# Patient Record
Sex: FEMALE | Race: WHITE | NOT HISPANIC OR LATINO | URBAN - METROPOLITAN AREA
[De-identification: names, ages, dates, MRNs, and addresses within clinical notes are randomized per-mention and may not be internally consistent; named-entity substitution may affect disease eponyms.]

---

## 2023-04-21 ENCOUNTER — APPOINTMENT (OUTPATIENT)
Dept: LAB | Facility: LAB | Age: 28
End: 2023-04-21
Payer: COMMERCIAL

## 2023-05-08 LAB
ALANINE AMINOTRANSFERASE (SGPT) (U/L) IN SER/PLAS: 13 U/L (ref 7–45)
ALBUMIN (G/DL) IN SER/PLAS: 4.5 G/DL (ref 3.4–5)
ALBUMIN (MG/L) IN URINE: <7 MG/L
ALBUMIN/CREATININE (UG/MG) IN URINE: NORMAL UG/MG CRT (ref 0–30)
ALKALINE PHOSPHATASE (U/L) IN SER/PLAS: 59 U/L (ref 33–110)
ANION GAP IN SER/PLAS: 16 MMOL/L (ref 10–20)
APPEARANCE, URINE: CLEAR
ASPARTATE AMINOTRANSFERASE (SGOT) (U/L) IN SER/PLAS: 14 U/L (ref 9–39)
BASOPHILS (10*3/UL) IN BLOOD BY AUTOMATED COUNT: 0.05 X10E9/L (ref 0–0.1)
BASOPHILS/100 LEUKOCYTES IN BLOOD BY AUTOMATED COUNT: 0.6 % (ref 0–2)
BILIRUBIN TOTAL (MG/DL) IN SER/PLAS: 0.4 MG/DL (ref 0–1.2)
BILIRUBIN, URINE: NEGATIVE
BLOOD, URINE: NEGATIVE
CALCIDIOL (25 OH VITAMIN D3) (NG/ML) IN SER/PLAS: 46 NG/ML
CALCIUM (MG/DL) IN SER/PLAS: 9.7 MG/DL (ref 8.6–10.6)
CARBON DIOXIDE, TOTAL (MMOL/L) IN SER/PLAS: 24 MMOL/L (ref 21–32)
CHLORIDE (MMOL/L) IN SER/PLAS: 104 MMOL/L (ref 98–107)
CHOLESTEROL (MG/DL) IN SER/PLAS: 161 MG/DL (ref 0–199)
CHOLESTEROL IN HDL (MG/DL) IN SER/PLAS: 57.1 MG/DL
CHOLESTEROL/HDL RATIO: 2.8
COLOR, URINE: YELLOW
CREATININE (MG/DL) IN SER/PLAS: 0.75 MG/DL (ref 0.5–1.05)
CREATININE (MG/DL) IN URINE: 53.2 MG/DL (ref 20–320)
EOSINOPHILS (10*3/UL) IN BLOOD BY AUTOMATED COUNT: 0.28 X10E9/L (ref 0–0.7)
EOSINOPHILS/100 LEUKOCYTES IN BLOOD BY AUTOMATED COUNT: 3.4 % (ref 0–6)
ERYTHROCYTE DISTRIBUTION WIDTH (RATIO) BY AUTOMATED COUNT: 11.9 % (ref 11.5–14.5)
ERYTHROCYTE MEAN CORPUSCULAR HEMOGLOBIN CONCENTRATION (G/DL) BY AUTOMATED: 34 G/DL (ref 32–36)
ERYTHROCYTE MEAN CORPUSCULAR VOLUME (FL) BY AUTOMATED COUNT: 94 FL (ref 80–100)
ERYTHROCYTES (10*6/UL) IN BLOOD BY AUTOMATED COUNT: 4.19 X10E12/L (ref 4–5.2)
ESTIMATED AVERAGE GLUCOSE FOR HBA1C: 103 MG/DL
GAMMA GLUTAMYL TRANSFERASE (U/L) IN SER/PLAS: 16 U/L (ref 5–55)
GFR FEMALE: >90 ML/MIN/1.73M2
GLUCOSE (MG/DL) IN SER/PLAS: 98 MG/DL (ref 74–99)
GLUCOSE, URINE: NEGATIVE MG/DL
HEMATOCRIT (%) IN BLOOD BY AUTOMATED COUNT: 39.4 % (ref 36–46)
HEMOGLOBIN (G/DL) IN BLOOD: 13.4 G/DL (ref 12–16)
HEMOGLOBIN A1C/HEMOGLOBIN TOTAL IN BLOOD: 5.2 %
IGE (IU/L) IN SERUM OR PLASMA: 25 IU/ML (ref 0–214)
IMMATURE GRANULOCYTES/100 LEUKOCYTES IN BLOOD BY AUTOMATED COUNT: 0.2 % (ref 0–0.9)
KETONES, URINE: NEGATIVE MG/DL
LDL: 44 MG/DL (ref 0–99)
LEUKOCYTE ESTERASE, URINE: NEGATIVE
LEUKOCYTES (10*3/UL) IN BLOOD BY AUTOMATED COUNT: 8.2 X10E9/L (ref 4.4–11.3)
LYMPHOCYTES (10*3/UL) IN BLOOD BY AUTOMATED COUNT: 2.16 X10E9/L (ref 1.2–4.8)
LYMPHOCYTES/100 LEUKOCYTES IN BLOOD BY AUTOMATED COUNT: 26.3 % (ref 13–44)
MONOCYTES (10*3/UL) IN BLOOD BY AUTOMATED COUNT: 0.43 X10E9/L (ref 0.1–1)
MONOCYTES/100 LEUKOCYTES IN BLOOD BY AUTOMATED COUNT: 5.2 % (ref 2–10)
NEUTROPHILS (10*3/UL) IN BLOOD BY AUTOMATED COUNT: 5.26 X10E9/L (ref 1.2–7.7)
NEUTROPHILS/100 LEUKOCYTES IN BLOOD BY AUTOMATED COUNT: 64.3 % (ref 40–80)
NITRITE, URINE: NEGATIVE
NON HDL CHOLESTEROL: 104 MG/DL
NRBC (PER 100 WBCS) BY AUTOMATED COUNT: 0 /100 WBC (ref 0–0)
PH, URINE: 5 (ref 5–8)
PLATELETS (10*3/UL) IN BLOOD AUTOMATED COUNT: 248 X10E9/L (ref 150–450)
POTASSIUM (MMOL/L) IN SER/PLAS: 4.5 MMOL/L (ref 3.5–5.3)
PROTEIN TOTAL: 7.3 G/DL (ref 6.4–8.2)
PROTEIN, URINE: NEGATIVE MG/DL
RBC, URINE: NORMAL /HPF (ref 0–5)
SODIUM (MMOL/L) IN SER/PLAS: 139 MMOL/L (ref 136–145)
SPECIFIC GRAVITY, URINE: 1.01 (ref 1–1.03)
TRIGLYCERIDE (MG/DL) IN SER/PLAS: 302 MG/DL (ref 0–149)
UREA NITROGEN (MG/DL) IN SER/PLAS: 13 MG/DL (ref 6–23)
UROBILINOGEN, URINE: <2 MG/DL (ref 0–1.9)
VLDL: 60 MG/DL (ref 0–40)
WBC, URINE: NORMAL /HPF (ref 0–5)

## 2023-05-12 LAB
VITAMIN A (RETINOL): 1.04 MG/L (ref 0.3–1.2)
VITAMIN A (RETINYL PALMITATE): 0.07 MG/L (ref 0–0.1)
VITAMIN A, INTERPRETATION: NORMAL
VITAMIN E (ALPHA-TOCOPHEROL): 21.1 MG/L (ref 5.5–18)
VITAMIN E (GAMMA-TOCOPHEROL): 0.3 MG/L (ref 0–6)

## 2023-05-13 LAB
RESPIRATORY CULTURE, CYSTIC FIBROSIS: ABNORMAL

## 2023-05-30 LAB
FUNGAL CULTURE/SMEAR: NORMAL
FUNGAL SMEAR: NORMAL

## 2023-06-28 LAB
AFB CULTURE: NORMAL
AFB STAIN: NORMAL

## 2023-10-21 ENCOUNTER — PHARMACY VISIT (OUTPATIENT)
Dept: PHARMACY | Facility: CLINIC | Age: 28
End: 2023-10-21
Payer: MEDICAID

## 2023-10-21 PROCEDURE — RXMED WILLOW AMBULATORY MEDICATION CHARGE

## 2023-11-13 ENCOUNTER — PHARMACY VISIT (OUTPATIENT)
Dept: PHARMACY | Facility: CLINIC | Age: 28
End: 2023-11-13
Payer: MEDICAID

## 2023-11-13 PROCEDURE — RXMED WILLOW AMBULATORY MEDICATION CHARGE

## 2023-11-16 ENCOUNTER — SPECIALTY PHARMACY (OUTPATIENT)
Dept: PHARMACY | Facility: CLINIC | Age: 28
End: 2023-11-16

## 2023-11-30 ENCOUNTER — NUTRITION (OUTPATIENT)
Dept: PEDIATRIC PULMONOLOGY | Facility: HOSPITAL | Age: 28
End: 2023-11-30
Payer: COMMERCIAL

## 2023-12-11 PROCEDURE — RXMED WILLOW AMBULATORY MEDICATION CHARGE

## 2023-12-12 ENCOUNTER — SPECIALTY PHARMACY (OUTPATIENT)
Dept: PHARMACY | Facility: CLINIC | Age: 28
End: 2023-12-12

## 2023-12-13 ENCOUNTER — PHARMACY VISIT (OUTPATIENT)
Dept: PHARMACY | Facility: CLINIC | Age: 28
End: 2023-12-13
Payer: MEDICAID

## 2024-01-08 PROCEDURE — RXMED WILLOW AMBULATORY MEDICATION CHARGE

## 2024-01-09 ENCOUNTER — PHARMACY VISIT (OUTPATIENT)
Dept: PHARMACY | Facility: CLINIC | Age: 29
End: 2024-01-09
Payer: MEDICAID

## 2024-01-18 NOTE — PROGRESS NOTES
Neftali Walsh M.D. 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 Her last hospitalization was in May of 2018 when she was treated with IV cefepime and tobramycin and PO Bactrim for MRSA and Stenotrophomonas coverage.    HPI (1/18/2024):  Previously saw Dr. Juarez in CF clinic. This is my first time meeting Kinjal.  She reports no somatic concerns today. She has no sinus issues, breathing concerns, change in sputum production (usually has increase at night). She feels her reflux is well controlled with omeprazole and her bowel habits have been well regulated with Creon. 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 Denies any disruption in her sleep.    Social:   Intern in Pediatrics at [employer]  Lives with her boyfriend of 8 years  [facility] and medical school  From [facility], immediate family has since moved to [place]     Current Use of Health Management Strategies:    Respiratory Interventions  Albuterol: Other ; 1-2x/month; sometimes before a cold with relief  Pulmozyme: 1-2x/month  Hypertonic saline: Does not use; prior to Trikafta used 7% bid   HFCWO (percussive vest): Hillrom Vest 1-2 x/month  Oscillatory PEP: Does not use  Exercise: No Regular Exercise  LTE antagonist: No  Azithromycin: not any more  Aztreonam lysine ([place]): Does not use  Tobramycin Does not use: Does not use  Colistin: Does not use  Meropenem (inhaled): Does not use  ICS: Does not use  ICS/LABA: Mometasone-formoterol (Dulera HFA)  200mg-5mg- uses daily  LAMA: No  CFTR modulator: Elexacaftor-Tezacaftor-Ivacaftor (Trikafta) Full-dose (2 orange tablets in AM, 1 blue tablet in PM) - Started December 2020  Oxygen: Does not  use    Digestive Health Interventions    Acid-suppressing medication?: Yes - AM omperazole  Using CF vitamins?: Yes  Taking pancreatic enzymes?: Yes Creon 24 2 meals 1 snacks  Any diarrhea?: No  Any steatorrhea?: No  Any constipation?: No  Using laxatives?: No  Feeding tube?: No  Supplemental enteral nutrition?: No    Pulmonary Function Test Results  1/24/24: FEV1/FVC: 70; FEV1=2.97L (92% pred)  5/8/23: FEV1/FVC 70; FEV1=3.03L (94% pred)  4/12/21: FEV1/FVC 72: FEV1 3.16L (96% pred)        Current Medications     Current Outpatient Medications   Medication Instructions    elexacaftor-tezacaftor-ivacaft (Trikafta) 100-50-75 mg tablet TAKE TWO (2) ORANGE TABLETS BY MOUTH EVERY MORNING AND ONE (1) BLUE TABLET BY MOUTH EVERY EVENING ABOUT 12 HOURS APART. TAKE WITH FATTY FOODS       Physical Examination     Vitals:    01/24/24 0907   BP: 117/90   Pulse: 98   Resp: 18   Temp: 36.7 °C (98.1 °F)   SpO2: 99%     General: ambulated independently; no acute distress; thin but well-nourished; work of breathing was not increased; normal vocal character  HEENT: normocephalic; anicteric sclerae; conjunctivae not injected; nasal mucosa was unremarkable; oropharynx was clear without evidence of thrush; dentition was good.  Neck: supple; no lymphadenopathy  Chest: clear to auscultation bilaterally  Cardiac: regular rhythm; no gallop or murmur.  Abdomen: soft; non-tender; non-distended; no hepatosplenomegaly.  Extremities: no leg edema; +mild digital clubbing; 2+ pulses  Psychiatric: did not appear depressed or anxious.     Metabolic Parameters     Sodium   Date/Time Value Ref Range Status   05/08/2023 12:24  136 - 145 mmol/L Final     Potassium   Date/Time Value Ref Range Status   05/08/2023 12:24 PM 4.5 3.5 - 5.3 mmol/L Final     Chloride   Date/Time Value Ref Range Status   05/08/2023 12:24  98 - 107 mmol/L Final     Bicarbonate   Date/Time Value Ref Range Status   05/08/2023 12:24 PM 24 21 - 32 mmol/L Final     Anion Gap    Date/Time Value Ref Range Status   05/08/2023 12:24 PM 16 10 - 20 mmol/L Final     Urea Nitrogen   Date/Time Value Ref Range Status   05/08/2023 12:24 PM 13 6 - 23 mg/dL Final     Creatinine   Date/Time Value Ref Range Status   05/08/2023 12:24 PM 0.75 0.50 - 1.05 mg/dL Final     GFR Female   Date/Time Value Ref Range Status   05/08/2023 12:24 PM >90 >90 mL/min/1.73m2 Final     Comment:      CALCULATIONS OF ESTIMATED GFR ARE PERFORMED   USING THE 2021 CKD-EPI STUDY REFIT EQUATION   WITHOUT THE RACE VARIABLE FOR THE IDMS-TRACEABLE   CREATININE METHODS.    https://jasn.asnjournals.org/content/early/2021/09/22/ASN.8101992564       Glucose   Date/Time Value Ref Range Status   05/08/2023 12:24 PM 98 74 - 99 mg/dL Final     Calcium   Date/Time Value Ref Range Status   05/08/2023 12:24 PM 9.7 8.6 - 10.6 mg/dL Final       Hematologic Parameters     WBC   Date/Time Value Ref Range Status   05/08/2023 12:24 PM 8.2 4.4 - 11.3 x10E9/L Final     Neutrophils Absolute   Date/Time Value Ref Range Status   05/08/2023 12:24 PM 5.26 1.20 - 7.70 x10E9/L Final     Neutrophils %   Date/Time Value Ref Range Status   05/08/2023 12:24 PM 64.3 40.0 - 80.0 % Final     Lymphocytes %   Date/Time Value Ref Range Status   05/08/2023 12:24 PM 26.3 13.0 - 44.0 % Final     Monocytes %   Date/Time Value Ref Range Status   05/08/2023 12:24 PM 5.2 2.0 - 10.0 % Final     Basophils %   Date/Time Value Ref Range Status   05/08/2023 12:24 PM 0.6 0.0 - 2.0 % Final     Eosinophils Absolute   Date/Time Value Ref Range Status   05/08/2023 12:24 PM 0.28 0.00 - 0.70 x10E9/L Final     Eosinophils %   Date/Time Value Ref Range Status   05/08/2023 12:24 PM 3.4 0.0 - 6.0 % Final     Hemoglobin   Date/Time Value Ref Range Status   05/08/2023 12:24 PM 13.4 12.0 - 16.0 g/dL Final     Hematocrit   Date/Time Value Ref Range Status   05/08/2023 12:24 PM 39.4 36.0 - 46.0 % Final     RBC   Date/Time Value Ref Range Status   05/08/2023 12:24 PM 4.19 4.00 - 5.20 x10E12/L  Final     MCV   Date/Time Value Ref Range Status   05/08/2023 12:24 PM 94 80 - 100 fL Final     MCHC   Date/Time Value Ref Range Status   05/08/2023 12:24 PM 34.0 32.0 - 36.0 g/dL Final     Platelets   Date/Time Value Ref Range Status   05/08/2023 12:24  150 - 450 x10E9/L Final       Fat-Soluble Vitamin Levels     Vitamin D, 25-Hydroxy   Date/Time Value Ref Range Status   05/08/2023 12:24 PM 46 ng/mL Final     Comment:     .  DEFICIENCY:         < 20   NG/ML  INSUFFICIENCY:      20-29  NG/ML  SUFFICIENCY:         NG/ML    THIS ASSAY ACCURATELY QUANTIFIES THE SUM OF  VITAMIN D3, 25-HYDROXY AND VIT D2,25-HYDROXY.       Vitamin A (Retinol)   Date/Time Value Ref Range Status   05/08/2023 12:24 PM 1.04 0.30 - 1.20 mg/L Final     Vitamin A, Interpretation   Date/Time Value Ref Range Status   05/08/2023 12:24 PM Normal  Final     Comment:     This test was developed and its performance characteristics   determined by [facility]. It has not been cleared or   approved by the US Food and Drug Administration. This test was   performed in a CLIA certified laboratory and is intended for   clinical purposes.  Performed By: [facility]  [address] [place] [address]  : Trevor Gilliam MD, PhD       Vitamin E (Alpha-Tocopherol)   Date/Time Value Ref Range Status   05/08/2023 12:24 PM 21.1 (H) 5.5 - 18.0 mg/L Final     Comment:     This test was developed and its performance characteristics   determined by [facility]. It has not been cleared or   approved by the US Food and Drug Administration. This test was   performed in a CLIA certified laboratory and is intended for   clinical purposes.       Vitamin E (Gamma-Tocopherol)   Date/Time Value Ref Range Status   05/08/2023 12:24 PM 0.3 0.0 - 6.0 mg/L Final     Comment:     Performed By: [facility]  [address] [place], [place] [id number]  : Trevor Gilliam MD, PhD         LFT and  Associated Parameters     AST   Date/Time Value Ref Range Status   05/08/2023 12:24 PM 14 9 - 39 U/L Final     ALT (SGPT)   Date/Time Value Ref Range Status   05/08/2023 12:24 PM 13 7 - 45 U/L Final     Comment:      Patients treated with Sulfasalazine may generate    falsely decreased results for ALT.       Alkaline Phosphatase   Date/Time Value Ref Range Status   05/08/2023 12:24 PM 59 33 - 110 U/L Final     Total Bilirubin   Date/Time Value Ref Range Status   05/08/2023 12:24 PM 0.4 0.0 - 1.2 mg/dL Final     GGT   Date/Time Value Ref Range Status   05/08/2023 12:24 PM 16 5 - 55 U/L Final     Albumin   Date/Time Value Ref Range Status   05/08/2023 12:24 PM 4.5 3.4 - 5.0 g/dL Final     Total Protein   Date/Time Value Ref Range Status   05/08/2023 12:24 PM 7.3 6.4 - 8.2 g/dL Final       Coagulation Parameters     Protime   Date/Time Value Ref Range Status   05/29/2019 02:05 PM 11.4 9.7 - 12.7 sec Final     INR   Date/Time Value Ref Range Status   05/29/2019 02:05 PM 1.0 0.9 - 1.1 Final       Diabetes Laboratory Tests     Hemoglobin A1C   Date/Time Value Ref Range Status   05/08/2023 12:24 PM 5.2 % Final     Comment:          Diagnosis of Diabetes-Adults   Non-Diabetic: < or = 5.6%   Increased risk for developing diabetes: 5.7-6.4%   Diagnostic of diabetes: > or = 6.5%  .       Monitoring of Diabetes                Age (y)     Therapeutic Goal (%)   Adults:          >18           <7.0   Pediatrics:    13-18           <7.5                   7-12           <8.0                   0- 6            7.5-8.5   American Diabetes Association. Diabetes Care 33(S1), Jan 2010.     09/17/2022 09:02 AM 5.1 4.7 - 5.6 % Final     Albumin/Creatine Ratio   Date/Time Value Ref Range Status   05/08/2023 12:40 PM SEE COMMENT 0.0 - 30.0 ug/mg crt Final     Comment:     One or more analytes used in this calculation   is outside of the analytical measurement range.  Calculation cannot be performed.          Immunology Laboratory Tests      IgE   Date/Time Value Ref Range Status   05/08/2023 12:24 PM 25 0 - 214 IU/mL Final       DEXA Scan     XR DEXA bone density 05/08/2023    Narrative  Interpreted By:  RAMILA MORGAN MD  Name: ADRIÁN VALENZUELA  Patient ID: [id number] YOB: 1995 Height: 64.0 in.  Gender:     Female   Exam Date:  05/08/2023 Weight: 135.0 lbs.  Indications: Cystis Fibrosis, Family Histoy Osteoporosis, Post  Menopausal, Screening  Fractures:    Treatments:  Multivitamin        LEFT FEMUR - TOTAL  The bone mineral density : 0.827 g/cm2  T-score : -1.4    % of young normal mean :82 %  Z-score : -1.4    % of age matched mean : 82 %  % change vs. Previous : -     % change vs. Baseline : baseline  *Indicates significant change based on 95% confidence interval.    LEFT FEMUR - NECK  The bone mineral density : 0.866 g/cm2  T-score : -1.2    % of young normal mean: 83 %  Z-score : -1.2    % of age matched mean : 84 %  % change vs. Previous : -     % change vs. Baseline : baseline  *Indicates significant change based on 95% confidence interval.    SPINE L1-L4  The bone mineral density is 0.897 g/cm2  T-score : -2.4   % of young normal mean is 76 %  Z-score : -2.4    % of age matched mean is 76 %  % change vs. Previous : -     % change vs. Baseline : baseline  *Indicates significant change based on 95% confidence interval.    Interpretation:  The lowest Z-score measured at AP Spine L1-L4 with a Z-score of -2.4  is below normal limits for their age and sex.    Follow-up DEXA and treatment is recommended as clinically indicated.    All images and detailed analysis are available on the  Radiology  PACS.       Chest Radiograph     XR chest 2 view 05/08/2023    Narrative  Interpreted By:  TISHA RODRIGUEZ MD and MANUEL HERNANDEZ MD  MRN: [id number]  Patient Name: ADRIÁN VALENZUELA    STUDY:  TH CHEST 2 VIEW PA AND LAT;  5/8/2023 2:32 pm    INDICATION:  baseline for CF, malabsorption  Z00.00: Health Maintenance E84.9:  Cystic  fibrosis.    COMPARISON:  Chest radiograph, 05/25/2018    ACCESSION NUMBER(S):  [id number]    ORDERING CLINICIAN:  CRISTINA VELASQUEZ    FINDINGS:  PA and lateral radiographs of the chest were provided.  Additional PA  dual energy images were also provided.    CARDIOMEDIASTINAL SILHOUETTE:  Cardiomediastinal silhouette is normal in size and configuration.    LUNGS:  Background changes of cystic fibrosis with cystic changes and mild  peribronchial thickening, not significantly changed compared to prior  study on 05/25/2018. No consolidation, pleural effusion, or  pneumothorax.    ABDOMEN:  No remarkable upper abdominal findings.    BONES:  No acute osseous changes.    Impression  1.  Background changes of cystic fibrosis, similar to prior study on  05/25/2018.  2. No acute cardiopulmonary process.    I personally reviewed the images/study and I agree with the findings  as stated by resident physician Dr. Dee Dee Freire.       CF Sputum Culture     AFB Culture   Date Value Ref Range Status   05/08/2023 NO MYCOBACTERIA ISOLATED.  Final      Respiratory Culture, Cystic Fibrosis   Date/Time Value Ref Range Status   05/08/2023 12:40 PM (A)  Corrected    3+ NORMAL THROAT HEATHER.~THIS REPORT CONTAINS ADDITIONAL INFORMATION ~NOT INCLUDED IN PREVIOUS FINAL REPORT. ~AN ADDITIONAL ISOLATE HAS BEEN IDENTIFIED AND ADDED TO THE ~REPORT. ADDITIONAL SUSCEPTIBILITIES ARE IN PROCESS.   05/08/2023 12:40 PM Haemophilus parainfluenzae (A)  Corrected     Comment:       3+~BETA LACTAMASE NEGATIVE.   05/08/2023 12:40 PM Staphylococcus aureus (A)  Corrected     Comment:      ONE COLONY~METHICILLIN(OXACILLIN)RESISTANT~METHICILLIN(OXACILLIN)RESISTANT STAPHYLOCOCCI ARE~RESISTANT TO ALL CURRENTLY AVAILABLE PENICILLINS,~BETA-LACTAM/BETA-LACTAMASE INHIBITOR COMBINATIONS~(INCLUDING AMPICILLIN/SULBACTAM, AMOXICILLIN/CLAVULANATE~AND   PIPERACILLIN/TAZOBACTAM),CARBAPENEMS AND ~CEPHALOSPORINS(EXCEPT CEFTAROLINE).         No results found for the last 90  days.      Problem List Items Addressed This Visit    None  Visit Diagnoses       Cystic fibrosis (CMS/HCC)    -  Primary    Relevant Orders    CBC and Auto Differential    Comprehensive Metabolic Panel    Gamma-Glutamyl Transferase    Immunoglobulin IgE    DCP; [facility]; DCP - Miscellaneous Test    Urinalysis with Reflex Microscopic    Vitamin D 25-Hydroxy,Total (for eval of Vitamin D levels)    Vitamin A    Vitamin E    AFB Culture/Smear    Fungal Culture/Smear    Respiratory Culture, Cystic Fibrosis    Hemoglobin A1c    Respiratory Culture, Cystic Fibrosis    US liver with doppler           Jadiel Reyes is a 28 year old F with hx of CF on Trikafta with last exacerbation in 2018 without need for OP antibiotics since that last admission. She has no somatic concerns today. We discussed establishing care with a primary care physician for routine screening (PAP smears OCP refills etc).     > Cystic Fibrosis  (dF508/dF508; FEV1=92%) with chronic MRSA, PSA  - Modulator: Trikafta (Start December 2020)  - Bronchodilators: Dulera, Albuterol  - HTS: 7% bid  - Dornase: daily  - Inhaled ABX: none  - Airway Clearance: Vest ?    Health Care Maintenance:  - Spirometry completed: today  - Annual lab:s Due 5/2024  - CF Cultures/Swab today -> Sputum sample provided today  - AFB today if able to produce sputum  - Last CXR - 5/8/23  - DEXA Scan - 5/8/23 - below limits of normal for age; repeat 2026  - Transplant - discussed indications for lung transplant in female patients with CF  - Colon Ca Screening - not yet due  - OGTT - n/a; has CFRD on insulin pump    > Exocrine Pancreatic Insufficiency  - 24Creon 2 capsules with meals and 1 capsule with snacks  - Continue DEKAs supplementation    > Weight Decrease  - noted stress from residency, does miss meals occasionally  - will continue to trend for now, is getting enough Ca in diet; encouraged high calorie foods including snacks      I spent 45 minutes on this encounter and  in-person visit.    Tami Brown MD   01/24/2024

## 2024-01-24 ENCOUNTER — NUTRITION (OUTPATIENT)
Dept: PEDIATRIC PULMONOLOGY | Facility: HOSPITAL | Age: 29
End: 2024-01-24
Payer: COMMERCIAL

## 2024-01-24 ENCOUNTER — LAB (OUTPATIENT)
Dept: LAB | Facility: LAB | Age: 29
End: 2024-01-24
Payer: COMMERCIAL

## 2024-01-24 ENCOUNTER — SPECIALTY PHARMACY (OUTPATIENT)
Dept: PHARMACY | Facility: CLINIC | Age: 29
End: 2024-01-24

## 2024-01-24 ENCOUNTER — ALLIED HEALTH (OUTPATIENT)
Dept: PEDIATRIC PULMONOLOGY | Facility: HOSPITAL | Age: 29
End: 2024-01-24
Payer: COMMERCIAL

## 2024-01-24 ENCOUNTER — MULTIDISCIPLINARY VISIT (OUTPATIENT)
Dept: PEDIATRIC PULMONOLOGY | Facility: HOSPITAL | Age: 29
End: 2024-01-24
Payer: COMMERCIAL

## 2024-01-24 ENCOUNTER — DOCUMENTATION (OUTPATIENT)
Dept: PEDIATRIC PULMONOLOGY | Facility: HOSPITAL | Age: 29
End: 2024-01-24

## 2024-01-24 ENCOUNTER — HOSPITAL ENCOUNTER (OUTPATIENT)
Dept: RESPIRATORY THERAPY | Facility: HOSPITAL | Age: 29
Discharge: HOME | End: 2024-01-24
Payer: COMMERCIAL

## 2024-01-24 ENCOUNTER — SOCIAL WORK (OUTPATIENT)
Dept: PEDIATRIC PULMONOLOGY | Facility: HOSPITAL | Age: 29
End: 2024-01-24
Payer: COMMERCIAL

## 2024-01-24 VITALS
HEART RATE: 98 BPM | HEIGHT: 64 IN | BODY MASS INDEX: 21.27 KG/M2 | TEMPERATURE: 98.1 F | WEIGHT: 124.56 LBS | RESPIRATION RATE: 18 BRPM | SYSTOLIC BLOOD PRESSURE: 117 MMHG | OXYGEN SATURATION: 99 % | DIASTOLIC BLOOD PRESSURE: 90 MMHG

## 2024-01-24 DIAGNOSIS — E84.9 CYSTIC FIBROSIS (MULTI): Primary | ICD-10-CM

## 2024-01-24 DIAGNOSIS — E84.9 CYSTIC FIBROSIS (MULTI): ICD-10-CM

## 2024-01-24 DIAGNOSIS — E84.9 CF (CYSTIC FIBROSIS) (MULTI): ICD-10-CM

## 2024-01-24 LAB
25(OH)D3 SERPL-MCNC: 48 NG/ML (ref 30–100)
ALBUMIN SERPL BCP-MCNC: 4.3 G/DL (ref 3.4–5)
ALP SERPL-CCNC: 48 U/L (ref 33–110)
ALT SERPL W P-5'-P-CCNC: 16 U/L (ref 7–45)
ANION GAP SERPL CALC-SCNC: 16 MMOL/L (ref 10–20)
APPEARANCE UR: ABNORMAL
AST SERPL W P-5'-P-CCNC: 17 U/L (ref 9–39)
BASOPHILS # BLD AUTO: 0.04 X10*3/UL (ref 0–0.1)
BASOPHILS NFR BLD AUTO: 0.6 %
BILIRUB SERPL-MCNC: 0.6 MG/DL (ref 0–1.2)
BILIRUB UR STRIP.AUTO-MCNC: NEGATIVE MG/DL
BUN SERPL-MCNC: 11 MG/DL (ref 6–23)
CALCIUM SERPL-MCNC: 9.9 MG/DL (ref 8.6–10.6)
CHLORIDE SERPL-SCNC: 105 MMOL/L (ref 98–107)
CO2 SERPL-SCNC: 23 MMOL/L (ref 21–32)
COLOR UR: ABNORMAL
CREAT SERPL-MCNC: 0.76 MG/DL (ref 0.5–1.05)
EGFRCR SERPLBLD CKD-EPI 2021: >90 ML/MIN/1.73M*2
EOSINOPHIL # BLD AUTO: 0.27 X10*3/UL (ref 0–0.7)
EOSINOPHIL NFR BLD AUTO: 4 %
ERYTHROCYTE [DISTWIDTH] IN BLOOD BY AUTOMATED COUNT: 11.9 % (ref 11.5–14.5)
EST. AVERAGE GLUCOSE BLD GHB EST-MCNC: 103 MG/DL
FEF 25-75: 1.88 L/S
FEV1/FVC: 70 %
FEV1: 2.97 LITERS
FVC: 4.23 LITERS
GGT SERPL-CCNC: 14 U/L (ref 5–55)
GLUCOSE SERPL-MCNC: 123 MG/DL (ref 74–99)
GLUCOSE UR STRIP.AUTO-MCNC: NEGATIVE MG/DL
HBA1C MFR BLD: 5.2 %
HCT VFR BLD AUTO: 40.3 % (ref 36–46)
HGB BLD-MCNC: 13.5 G/DL (ref 12–16)
IGE SERPL-ACNC: 19 IU/ML (ref 0–214)
IMM GRANULOCYTES # BLD AUTO: 0.01 X10*3/UL (ref 0–0.7)
IMM GRANULOCYTES NFR BLD AUTO: 0.1 % (ref 0–0.9)
KETONES UR STRIP.AUTO-MCNC: ABNORMAL MG/DL
LEUKOCYTE ESTERASE UR QL STRIP.AUTO: NEGATIVE
LYMPHOCYTES # BLD AUTO: 1.91 X10*3/UL (ref 1.2–4.8)
LYMPHOCYTES NFR BLD AUTO: 28.1 %
MCH RBC QN AUTO: 32.9 PG (ref 26–34)
MCHC RBC AUTO-ENTMCNC: 33.5 G/DL (ref 32–36)
MCV RBC AUTO: 98 FL (ref 80–100)
MONOCYTES # BLD AUTO: 0.48 X10*3/UL (ref 0.1–1)
MONOCYTES NFR BLD AUTO: 7.1 %
MUCOUS THREADS #/AREA URNS AUTO: NORMAL /LPF
NEUTROPHILS # BLD AUTO: 4.08 X10*3/UL (ref 1.2–7.7)
NEUTROPHILS NFR BLD AUTO: 60.1 %
NITRITE UR QL STRIP.AUTO: NEGATIVE
NRBC BLD-RTO: 0 /100 WBCS (ref 0–0)
PEF: 8 L/S
PH UR STRIP.AUTO: 5 [PH]
PLATELET # BLD AUTO: 240 X10*3/UL (ref 150–450)
POTASSIUM SERPL-SCNC: 4.1 MMOL/L (ref 3.5–5.3)
PROT SERPL-MCNC: 7.3 G/DL (ref 6.4–8.2)
PROT UR STRIP.AUTO-MCNC: ABNORMAL MG/DL
RBC # BLD AUTO: 4.1 X10*6/UL (ref 4–5.2)
RBC # UR STRIP.AUTO: NEGATIVE /UL
RBC #/AREA URNS AUTO: NORMAL /HPF
SODIUM SERPL-SCNC: 140 MMOL/L (ref 136–145)
SP GR UR STRIP.AUTO: 1.02
UROBILINOGEN UR STRIP.AUTO-MCNC: <2 MG/DL
WBC # BLD AUTO: 6.8 X10*3/UL (ref 4.4–11.3)
WBC #/AREA URNS AUTO: NORMAL /HPF

## 2024-01-24 PROCEDURE — 87186 SC STD MICRODIL/AGAR DIL: CPT | Mod: 59 | Performed by: STUDENT IN AN ORGANIZED HEALTH CARE EDUCATION/TRAINING PROGRAM

## 2024-01-24 PROCEDURE — 87116 MYCOBACTERIA CULTURE: CPT | Performed by: STUDENT IN AN ORGANIZED HEALTH CARE EDUCATION/TRAINING PROGRAM

## 2024-01-24 PROCEDURE — 94010 BREATHING CAPACITY TEST: CPT

## 2024-01-24 PROCEDURE — 87102 FUNGUS ISOLATION CULTURE: CPT | Performed by: STUDENT IN AN ORGANIZED HEALTH CARE EDUCATION/TRAINING PROGRAM

## 2024-01-24 PROCEDURE — 83036 HEMOGLOBIN GLYCOSYLATED A1C: CPT

## 2024-01-24 PROCEDURE — 82306 VITAMIN D 25 HYDROXY: CPT

## 2024-01-24 PROCEDURE — 82977 ASSAY OF GGT: CPT

## 2024-01-24 PROCEDURE — 99215 OFFICE O/P EST HI 40 MIN: CPT | Mod: 25 | Performed by: STUDENT IN AN ORGANIZED HEALTH CARE EDUCATION/TRAINING PROGRAM

## 2024-01-24 PROCEDURE — 80053 COMPREHEN METABOLIC PANEL: CPT

## 2024-01-24 PROCEDURE — 82785 ASSAY OF IGE: CPT

## 2024-01-24 PROCEDURE — 81001 URINALYSIS AUTO W/SCOPE: CPT

## 2024-01-24 PROCEDURE — 85025 COMPLETE CBC W/AUTO DIFF WBC: CPT

## 2024-01-24 RX ORDER — PEDIATRIC MULTIVIT 61/D3/VIT K 1500-800
CAPSULE ORAL
COMMUNITY
Start: 2018-08-08

## 2024-01-24 RX ORDER — OMEPRAZOLE 20 MG/1
20 TABLET, DELAYED RELEASE ORAL
Qty: 30 TABLET | Refills: 11 | Status: SHIPPED | OUTPATIENT
Start: 2024-01-24 | End: 2024-02-16

## 2024-01-24 RX ORDER — NORGESTIMATE AND ETHINYL ESTRADIOL 0.25-0.035
KIT ORAL
COMMUNITY

## 2024-01-24 RX ORDER — PANCRELIPASE 24000; 76000; 120000 [USP'U]/1; [USP'U]/1; [USP'U]/1
300 CAPSULE, DELAYED RELEASE PELLETS ORAL
COMMUNITY
Start: 2015-07-17

## 2024-01-24 RX ORDER — MOMETASONE FUROATE AND FORMOTEROL FUMARATE DIHYDRATE 200; 5 UG/1; UG/1
2 AEROSOL RESPIRATORY (INHALATION) 2 TIMES DAILY
COMMUNITY
Start: 2015-05-27

## 2024-01-24 RX ORDER — OMEPRAZOLE 20 MG/1
20 TABLET, DELAYED RELEASE ORAL
COMMUNITY
End: 2024-01-24 | Stop reason: SDUPTHER

## 2024-01-24 RX ORDER — PEN NEEDLE, DIABETIC 31 GX5/16"
NEEDLE, DISPOSABLE MISCELLANEOUS DAILY
COMMUNITY
Start: 2023-04-05 | End: 2024-03-20 | Stop reason: SDUPTHER

## 2024-01-24 RX ORDER — OSELTAMIVIR PHOSPHATE 75 MG/1
CAPSULE ORAL 2 TIMES DAILY
COMMUNITY
Start: 2014-12-02

## 2024-01-24 RX ORDER — CETIRIZINE HYDROCHLORIDE 10 MG/1
10 TABLET ORAL DAILY
COMMUNITY
End: 2024-01-24 | Stop reason: SDUPTHER

## 2024-01-24 RX ORDER — INSULIN DETEMIR 100 [IU]/ML
INJECTION, SOLUTION SUBCUTANEOUS
COMMUNITY
Start: 2015-02-03

## 2024-01-24 RX ORDER — SODIUM CHLORIDE FOR INHALATION 7 %
VIAL, NEBULIZER (ML) INHALATION
COMMUNITY
Start: 2019-01-08

## 2024-01-24 RX ORDER — CETIRIZINE HYDROCHLORIDE 10 MG/1
10 TABLET ORAL DAILY
Qty: 30 TABLET | Refills: 11 | Status: SHIPPED | OUTPATIENT
Start: 2024-01-24 | End: 2025-01-23

## 2024-01-24 ASSESSMENT — ANXIETY QUESTIONNAIRES
5. BEING SO RESTLESS THAT IT IS HARD TO SIT STILL: NOT AT ALL
GAD7 TOTAL SCORE: 0
7. FEELING AFRAID AS IF SOMETHING AWFUL MIGHT HAPPEN: NOT AT ALL
2. NOT BEING ABLE TO STOP OR CONTROL WORRYING: NOT AT ALL
1. FEELING NERVOUS, ANXIOUS, OR ON EDGE: NOT AT ALL
6. BECOMING EASILY ANNOYED OR IRRITABLE: NOT AT ALL
4. TROUBLE RELAXING: NOT AT ALL
3. WORRYING TOO MUCH ABOUT DIFFERENT THINGS: NOT AT ALL

## 2024-01-24 ASSESSMENT — PATIENT HEALTH QUESTIONNAIRE - PHQ9
2. FEELING DOWN, DEPRESSED OR HOPELESS: NOT AT ALL
5. POOR APPETITE OR OVEREATING: NOT AT ALL
4. FEELING TIRED OR HAVING LITTLE ENERGY: NOT AT ALL
3. TROUBLE FALLING OR STAYING ASLEEP OR SLEEPING TOO MUCH: NOT AT ALL
9. THOUGHTS THAT YOU WOULD BE BETTER OFF DEAD, OR OF HURTING YOURSELF: NOT AT ALL
8. MOVING OR SPEAKING SO SLOWLY THAT OTHER PEOPLE COULD HAVE NOTICED. OR THE OPPOSITE, BEING SO FIGETY OR RESTLESS THAT YOU HAVE BEEN MOVING AROUND A LOT MORE THAN USUAL: NOT AT ALL
1. LITTLE INTEREST OR PLEASURE IN DOING THINGS: NOT AT ALL
6. FEELING BAD ABOUT YOURSELF - OR THAT YOU ARE A FAILURE OR HAVE LET YOURSELF OR YOUR FAMILY DOWN: NOT AT ALL
7. TROUBLE CONCENTRATING ON THINGS, SUCH AS READING THE NEWSPAPER OR WATCHING TELEVISION: NOT AT ALL

## 2024-01-24 ASSESSMENT — PAIN SCALES - GENERAL: PAINLEVEL: 0-NO PAIN

## 2024-01-24 NOTE — PROGRESS NOTES
Pt. Is in clinic alone. 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    Pt. Completed LULI today.     Pt. Has been losing wt. Since May. Pt. Reports that she has not been trying to lose wt., but did admit to eating less due to busyness in residency. Pt. Reports that her current wt. Is close to her baseline of 125#. RD explained that the concern is the wt. Loss without trying to lose it and encouraged pt. To eat more consistently while working. RD encouraged pt. To keep snacks she likes to eat close by so she can eat throughout the day. RD also recommended pt. Add kcal boosters to evening meals/meals she's not working for. Pt. Stated that she understood and was agreeable.     RD asked about calcium intake. Pt. Reports she drinks milk daily and snacks on cheese and yogurt. RD discussed the importance of making sure she gets 3 servings of dairy daily for bone health. Pt. Stated that she understood and was agreeable.     Pt. Had no further concerns at this time. RD provided contact info and encouraged pt. To reach out as nutritional concerns arise. Pt. Was agreeable.      Impression:  At risk BMI with wt. Loss  LULI: SMM 46.3 (lack), PBF 31.3%  Pt. Eats well-balanced diet    Recommendations:  Recommend eating ready to eat snacks during residency  Recommend add kcal boosters to meals when not working  Recommend continue to eat well-balanced diet   Recommend reach out to RD as nutritional concerns arise

## 2024-01-24 NOTE — PROGRESS NOTES
Subjective   Patient ID: Kinjal Reyes is a 28 y.o. female who presents for Cf outpatient appt.    Social Work Visit Type: This is a Follow-Up visit for Kinjal Reyes  Location: [facility]    Identifying Information                              : 1995  Assessment date: 2024    Objective     Patient accompanied by:  self    Family System / Parents:    Raised by:  with biological parent(s)  Mother:  Name:  Masha  Father:  Name:  Jean Paul  Siblings & Children Information:  Siblings: brothers: 1 older brother and sisters: 1 younger sister    Relationships / Social History:  Patient lives with: patient lives with partner, Luis and Glenn wise  Relationship status: Relationship Status / Family Dynamic: In a Relationship: Yes  How Long?  8 years   Marital/relationship status:  no relationship issues at this time  Does patient have adequate housing?:Rents home  Does patient feel safe in home?: Yes  Supportive Relationships: spouse/partner, friends/neighbors, and family    Education:  Education: Highest Level of Education: Post-Graduate Degree  Employer information: Place of employment , Occupation/How long 1st year Residency, and Full-time    Income / Insurance:  Total Household Income:  $[id number]  Insurance Options:  Private  Employee plan      Disability  Are you on any form of disability? no    Adherence and Understanding of Health:  Knowledge of health:  good, Overall adherence:  good, Adherence with medications:  good, Managed by: patient, Understanding of medication:  good, and Adherence with appointments:   fair    Cognition/Mood/Affect:  The patient was neatly groomed, appropriately dressed and adequately nourished.  What are the patient's psychosocial stressors:  work and managing tasks outside of work    Mood:   How has your mood been lately?: good  How do you manage stress?: talk with partner, walk dog for walk  What are your goals for this year?: get through first year of residency    Thought Processes    Organized?  yes  Have you ever been hospitalized in a psychiatric hospital? no  Do you currently or have you ever seen a therapist/counselor/psychiatrist? no  Have you used medications for mental health issues, sleep and/or pain now or in the past?  no    Substance Use:  Alcohol - 2-3 drinks a week    Advance Directives  Do you have an advance directive/living will?  no  has NO advanced directive - not interested in additional information    Impression:  patient open and agreeable to AA/MHS. Patient is a resident at . SW encouraged patient to focus on self-care in residency  PHQ9: 0  GAD7: 0    Assessment/Plan   Plan: SW will continue to follow    -ALEX Virk

## 2024-01-24 NOTE — PROGRESS NOTES
"[facility] SPECIALTY PHARMACY PATIENT REASSESSMENT NOTE    Kinjal Reyes is a 28 y.o. year-old female seen in clinic .    [facility] SUPPLIED MEDICATION:  Johny Reyes's care will be continued with the referring prescriber.     GENERAL ASSESSMENT:  Are there any changes to your home medications, OTCs or supplements? No changes reported    Current Outpatient Medications on File Prior to Visit   Medication Sig Dispense Refill    BD Ultra-Fine Bea Pen Needle 32 gauge x 5/32\" needle once daily.      cetirizine (ZyrTEC) 10 mg tablet Take 1 tablet (10 mg) by mouth once daily. 30 tablet 11    Dulera 200-5 mcg/actuation inhaler Inhale 2 puffs 2 times a day.      elexacaftor-tezacaftor-ivacaft (Trikafta) 100-50-75 mg tablet TAKE TWO (2) ORANGE TABLETS BY MOUTH EVERY MORNING AND ONE (1) BLUE TABLET BY MOUTH EVERY EVENING ABOUT 12 HOURS APART. TAKE WITH FATTY FOODS 84 each 11    insulin detemir (Levemir FlexPen) 100 unit/mL (3 mL) pen Inject under the skin.      lipase-protease-amylase (Creon) 24,000-76,000 -120,000 unit capsule Take 300 capsules by mouth. 2 with meals (3 meals/ day) and 1 with snacks (2 snacks/day)      Lorena 0.25-35 mg-mcg tablet TAKE 1 TABLET BY MOUTH 1 TIME EACH DAY.      omeprazole OTC (PriLOSEC OTC) 20 mg EC tablet Take 1 tablet (20 mg) by mouth once daily in the morning. Take before meals. Do not crush, chew, or split. 30 tablet 11    oseltamivir (Tamiflu) 75 mg capsule Take by mouth twice a day.      pediatric multivit 61-D3-vit K (MVW Complete Formulation ) 5,000-800 unit-mcg capsule Take by mouth.      sodium chloride 7 % solution for nebulization nebulizer solution Inhale.      [DISCONTINUED] cetirizine (ZyrTEC) 10 mg tablet Take 1 tablet (10 mg) by mouth once daily.      [DISCONTINUED] insulin detemir (Levemir) 100 unit/mL injection       [DISCONTINUED] omeprazole OTC (PriLOSEC OTC) 20 mg EC tablet Take 1 tablet (20 mg) by mouth once daily in the morning. Take before " meals. Do not crush, chew, or split.       No current facility-administered medications on file prior to visit.       Do you have any new allergies? no new allergies reported    Allergies as of 01/24/2024 - Reviewed 01/24/2024   Allergen Reaction Noted    Vancomycin Itching 01/24/2024       Have you been diagnosed with any new medical conditions? no new reported medical conditions    There is no problem list on file for this patient.      CFTR Genotype: [id number] and [id number]  Current CFTR Modulator: Trikafta  Dose: 2 orange tablets in the morning and 1 blue tablet in the evening with fat containing food      TOLERANCE:   Have you experienced any side effects from this medication? no reported side effects    Are there any changes to current therapy regimen? No changes reported    EFFICACY:    Have you developed any new symptoms of your condition? No changes reported    How do you feel your medication is affecting your disease state? Trikafta still great, feels good on Trikafta    GOALS:  Your goals of therapy are: Improved quality of life, Improve/maintain lung function, and Reduce frequency of illness    COMPLIANCE / ADHERENCE:  Have you had any unplanned missed doses?No  If yes, how often do you miss doses and is there a particular contributing reason?     What barriers to adherence does the patient have? (Answer Y/N for all):  Patient has a difficult time remembering to take medications? No  Difficult administration technique?Noglass of whole milk and dinner  Medication cost? No  Patient does not think medication is beneficial?No  Other?   What actions were taken to address barriers?    Does the patient have any barriers to self-administration (including physical and mental)? No  List barriers:   Describe actions taken to mitigate barriers:    Health Maintenance  Health Maintenance Due   Topic Date Due    Yearly Adult Physical  Never done    HIV Screening  Never done    Pneumococcal Vaccine: Pediatrics (0 to 5  Years) and At-Risk Patients (6 to 64 Years) (1 - PCV) Never done    Diabetes: Foot Exam  Never done    Diabetes: Retinopathy Screening  Never done    Hepatitis C Screening  Never done    Cervical Cancer Screening  Never done    DTaP/Tdap/Td Vaccines (7 - Tdap) 08/18/2019    Diabetes: Hemoglobin A1C  08/08/2023    Influenza Vaccine (1) 09/01/2023    COVID-19 Vaccine (5 - 2023-24 season) 09/01/2023       Labs  Lab Results   Component Value Date    WBC 8.2 05/08/2023    HGB 13.4 05/08/2023    HCT 39.4 05/08/2023     05/08/2023    CHOL 161 05/08/2023    TRIG 302 (H) 05/08/2023    HDL 57.1 05/08/2023    ALT 13 05/08/2023    AST 14 05/08/2023     05/08/2023    K 4.5 05/08/2023     05/08/2023    CREATININE 0.75 05/08/2023    BUN 13 05/08/2023    CO2 24 05/08/2023    TSH 1.59 05/29/2019    INR 1.0 05/29/2019    HGBA1C 5.2 05/08/2023       Pulmonary Function Tests     FEV1   Date/Time Value Ref Range Status   01/24/2024 09:33 AM 2.97 liters      Comment:     92%          PATIENT MANAGEMENT:    Do you have any questions regarding your medications, or care? no additional questions    Do you have any concerns with access to your medications? No concerns expressed    How would you classify your Quality of Life? Doing well    Fills medications at:  Specialty    How would you describe your satisfaction with  Specialty Pharmacy? Satisfied    Do you have any additional suggestions to improve  Specialty Pharmacy services: n/a    IMPRESSION/PLAN:  Is patient high risk (potential patients:  pregnancy, geriatric, pediatric)?  n/a  Is laboratory follow-up needed? Liver function tests due annually. Last done: May 2023  Is a clinical intervention needed? n/a        Michelle Guerra, PharmD   1/24/2024 10:48 AM

## 2024-01-24 NOTE — PATIENT INSTRUCTIONS
"It was very nice to see you today. We can offer you in-person and \"virtual\" appointments with your cystic fibrosis provider.    If you need to cancel or schedule an appointment, please call the  at the [facility] at [phone] between the hours of 8 AM and 5 PM, Monday-Friday.    To reach the CF nurse, Britt, please call [phone]. Britt has a secure voice mail account if you want to leave a message.  Both Britt and NAM are available by AIRSIS as well!    If you need to speak with an adult cystic fibrosis provider between the hours of 5 PM and 8 AM (overnight) or anytime on Saturday or Sunday, please call [phone]. When you call this number, you will be connected to an  with the [facility] and [facility] answering service. You should tell the  that you're an adult with cystic fibrosis who needs to speak to the \"cystic fibrosis doctor on-call.\" The  will take your contact information. You should then expect a call back from the cystic fibrosis doctor on-call within a short period of time.      Information on COVID-19 Vaccination:  Vaccines.gov (https://www.vaccines.gov/)  [place] Department of Health (https://gettheshot.coronavirus.[place].gov/)    If you have COVID-19 infection and we decide to treat with anti-viral medications, what pharmacies have medications in-stock?  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   (goal for adult males with CF = 23.0 kg/m2, goal for adult females with CF = 22.0 kg/m2)    Sick plan (please call our office if you think you need to take antibiotics or be admitted to the hospital):   [] Doxycycline 100 mg by mouth twice daily x 14 " days    We discussed the following:  - Please decrease Dulera to 1 puff twice daily; if you notice any change in your breathing or increase in albuterol usage pleas e resume 2 puffs twice daily and let us know   - Please make an appointment with Endocrinology (Dr. Matthews)  - Please establish care with a primary care ( Internal medicine or Family Practice - [facility] Scheduling number:  [phone]; this can also be done on-line through the  Website or ponUp)

## 2024-01-24 NOTE — PROGRESS NOTES
Respiratory Note:  Patient's Airway Clearance: AurelioBubba Vest (old big box unit)  Frequency: 1-2/week  Settings: Hz:  5-11-15 Not sure of pressure  Exercise: Walking the dog daily for 34 min  Oscillating Device: Aerobika (Not using)  Luo Cough: PRN  Primary: Exercise  Secondary: Vest and Luo Cough    Aerosols: Name of medication, frequency, type of nebulizer used, Mask or Mouthpiece?  Bronchodilators: Albuterol MDI PRN (only when sick and congested)  Pulmozyme: 1-2/week (does with vest)  Hypertonic Saline: No (causes severe coughing)  Antibiotics: No (Jaye in the past)  ICS/Combinations: Dulera 2p BID (decreasing to 1p BID)    Order of medication w/airway clearance:  Vest, Pulmozyme, Dulera    Hearing Test: No  Spacer: Yes  Compressor: In good working condition  Home PFT Device: WOLFGANG Spirobank  Frequency: Monthly (not consistent)  Technique: Good  Home PFT Action Plan:    Method of Cleaning Neb Cups: Baby Bottle Sterilizer  Method of Cleaning PFT Device: soap and sterile water, rinse, soak in 3% peroxide x 30 min, rinse with sterile water, completely air dry.    Pharmacy for respiratory meds: [facility] in [facility]  Patient Assistance Program: [facility]  Oxygen: No  Home Care Company:  LPM:  Continuous, nocturnal, PRN, intermittent w/exacerbation:   CPAP, BIPAP, Assisted Breathing (use at home or in-patient): No  Have you smoked cigarettes in the last year?: No  Are you exposed to second hand smoke or electronic cigarette vapor?: No  Does anyone in your household smoke cigarettes?: No  Did this patient use electronic cigarettes (vape) this year? No  During the reporting year, how often was this patient vaping? Not at all  Every Day...Some days...Not at all...Unknown.      Teaching: Kinjal needs a new copy of the cleaning/sterilization for the home PFT device. I emailed a copy of the instructions today. Kinjal will change out her sidestream neb cups (it has been more than 6 months).   Kinjal  did not know about the Portable vests. I gave her information about the Euless and the Afflo to read. She will let us know if this is something she would like to pursue.

## 2024-01-25 PROCEDURE — RXMED WILLOW AMBULATORY MEDICATION CHARGE

## 2024-01-26 ENCOUNTER — TELEPHONE (OUTPATIENT)
Dept: PULMONOLOGY | Facility: HOSPITAL | Age: 29
End: 2024-01-26
Payer: COMMERCIAL

## 2024-01-26 DIAGNOSIS — E84.9 CYSTIC FIBROSIS (MULTI): Primary | ICD-10-CM

## 2024-01-26 NOTE — TELEPHONE ENCOUNTER
New PSA growing on sputum culture - new since 2020. Will treat for irradication with tobramycin 300mg inh x 28 days with OP follow-up with repeat cultures.

## 2024-01-27 ENCOUNTER — PATIENT MESSAGE (OUTPATIENT)
Dept: PEDIATRIC PULMONOLOGY | Facility: HOSPITAL | Age: 29
End: 2024-01-27

## 2024-01-27 LAB
BACTERIA SPT CF RESP CULT: ABNORMAL
SCAN RESULT: NORMAL

## 2024-01-28 LAB
A-TOCOPHEROL VIT E SERPL-MCNC: 12.1 MG/L (ref 5.5–18)
ANNOTATION COMMENT IMP: NORMAL
BETA+GAMMA TOCOPHEROL SERPL-MCNC: 0.2 MG/L (ref 0–6)
RETINYL PALMITATE SERPL-MCNC: <0.02 MG/L (ref 0–0.1)
VIT A SERPL-MCNC: 0.91 MG/L (ref 0.3–1.2)

## 2024-01-29 ENCOUNTER — PATIENT MESSAGE (OUTPATIENT)
Dept: PEDIATRIC PULMONOLOGY | Facility: HOSPITAL | Age: 29
End: 2024-01-29
Payer: COMMERCIAL

## 2024-01-29 DIAGNOSIS — A49.8 PSEUDOMONAS AERUGINOSA INFECTION: ICD-10-CM

## 2024-01-29 DIAGNOSIS — E84.9 CYSTIC FIBROSIS (MULTI): ICD-10-CM

## 2024-01-29 LAB
FUNGUS SPEC CULT: ABNORMAL
FUNGUS SPEC FUNGUS STN: ABNORMAL

## 2024-01-29 RX ORDER — PEN NEEDLE, DIABETIC 31 GX5/16"
1 NEEDLE, DISPOSABLE MISCELLANEOUS 3 TIMES DAILY
Qty: 1 EACH | Refills: 0 | Status: SHIPPED | OUTPATIENT
Start: 2024-01-29 | End: 2024-02-05 | Stop reason: SDUPTHER

## 2024-01-29 NOTE — TELEPHONE ENCOUNTER
Called patient regarding PSA cultures results. Since new positive since 2020 and new since starting Trikafta will treat for eradication. Pt had SE's from tobramycin so will do [place] (already prescribed) + Levofloxacin (750mg PO once daily) with follow-up in clinic for repeat cultures.

## 2024-01-30 RX ORDER — LEVOFLOXACIN 750 MG/1
750 TABLET ORAL DAILY
Qty: 14 TABLET | Refills: 0 | Status: SHIPPED | OUTPATIENT
Start: 2024-01-30 | End: 2024-03-07

## 2024-01-31 ENCOUNTER — SPECIALTY PHARMACY (OUTPATIENT)
Dept: PHARMACY | Facility: CLINIC | Age: 29
End: 2024-01-31

## 2024-01-31 PROCEDURE — RXMED WILLOW AMBULATORY MEDICATION CHARGE

## 2024-02-02 ENCOUNTER — SPECIALTY PHARMACY (OUTPATIENT)
Dept: PHARMACY | Facility: CLINIC | Age: 29
End: 2024-02-02

## 2024-02-02 ENCOUNTER — PHARMACY VISIT (OUTPATIENT)
Dept: PHARMACY | Facility: CLINIC | Age: 29
End: 2024-02-02
Payer: MEDICAID

## 2024-02-05 ENCOUNTER — PATIENT MESSAGE (OUTPATIENT)
Dept: PEDIATRIC PULMONOLOGY | Facility: HOSPITAL | Age: 29
End: 2024-02-05

## 2024-02-05 ENCOUNTER — TELEMEDICINE (OUTPATIENT)
Dept: PRIMARY CARE | Facility: CLINIC | Age: 29
End: 2024-02-05
Payer: COMMERCIAL

## 2024-02-05 ENCOUNTER — APPOINTMENT (OUTPATIENT)
Dept: PRIMARY CARE | Facility: CLINIC | Age: 29
End: 2024-02-05
Payer: COMMERCIAL

## 2024-02-05 DIAGNOSIS — B34.9 VIRAL ILLNESS: Primary | ICD-10-CM

## 2024-02-05 DIAGNOSIS — R11.2 NAUSEA AND VOMITING, UNSPECIFIED VOMITING TYPE: ICD-10-CM

## 2024-02-05 DIAGNOSIS — A49.8 PSEUDOMONAS AERUGINOSA INFECTION: ICD-10-CM

## 2024-02-05 DIAGNOSIS — E84.9 CYSTIC FIBROSIS (MULTI): Primary | ICD-10-CM

## 2024-02-05 RX ORDER — PEN NEEDLE, DIABETIC 31 GX5/16"
1 NEEDLE, DISPOSABLE MISCELLANEOUS 3 TIMES DAILY
Qty: 1 EACH | Refills: 0 | Status: SHIPPED | OUTPATIENT
Start: 2024-02-05 | End: 2024-02-28 | Stop reason: SDUPTHER

## 2024-02-05 RX ORDER — ONDANSETRON 4 MG/1
4 TABLET, ORALLY DISINTEGRATING ORAL EVERY 8 HOURS PRN
Qty: 10 TABLET | Refills: 0 | Status: SHIPPED | OUTPATIENT
Start: 2024-02-05

## 2024-02-05 NOTE — PATIENT INSTRUCTIONS
Thank you for seeing me today.  It was a pleasure to meet you!    #Gastroenteritis  ~ Get plenty of rest  ~ Take OTC medicine, such as Tylenol, Ibuprofen (Motrin or Advil) or Aleve  ~ Take OTC Vitamin C, Zinc and/or Echinacea   ~ Drink plenty of water to thin the mucus and break up congestion   ~ Steamy showers   ~ Humidifier in bedroom     F/U WITH PCP AS NEEDED

## 2024-02-05 NOTE — PROGRESS NOTES
"Kinjal Reyes is a 28 y.o. female who presents virtually today for a sick visit    I performed this visit using real-time telehealth tools, including an audio/video connection between Kinjal Reyes  and myself, Lou Lomas CNP in the state [facility].  Consent obtained  Telemedicine appropriate evaluation completed.  Unable to perform complete physical exam due to virtual visit.      Chief Complaint   Patient presents with    Nausea    Vomiting       Symptoms: Nausea/Vomiting  Pt states she was with her niece and nephew on Saturday and they had a \"stomach bug\"     Pt denies headache, fever, abdominal pain     Symptom onset has been acute for a time period of 8 hour(s).   Severity is described as mild.     Course of her symptoms over time is acute.    Has taken: Nothing OTC     Review of Systems  All 13 systems were reviewed and are within normal limits except positive and pertinent negative responses which are noted below or in HPI.      Objective   Vitals:  There were no vitals taken for this visit.        Physical Exam  Pulmonary:      Effort: Pulmonary effort is normal. No respiratory distress.   Neurological:      Mental Status: She is alert and oriented to person, place, and time.   Psychiatric:         Mood and Affect: Mood normal.         Assessment/Plan   Problem List Items Addressed This Visit    None  Visit Diagnoses         Codes    Viral illness    -  Primary B34.9    Nausea and vomiting, unspecified vomiting type     R11.2    Relevant Medications    ondansetron ODT (Zofran-ODT) 4 mg disintegrating tablet            "

## 2024-02-06 PROCEDURE — RXMED WILLOW AMBULATORY MEDICATION CHARGE

## 2024-02-06 RX ORDER — PEN NEEDLE, DIABETIC 31 GX5/16"
1 NEEDLE, DISPOSABLE MISCELLANEOUS 3 TIMES DAILY
Qty: 1 EACH | Refills: 0 | Status: SHIPPED | OUTPATIENT
Start: 2024-02-06

## 2024-02-07 PROCEDURE — RXMED WILLOW AMBULATORY MEDICATION CHARGE

## 2024-02-08 ENCOUNTER — PATIENT MESSAGE (OUTPATIENT)
Dept: PEDIATRIC PULMONOLOGY | Facility: HOSPITAL | Age: 29
End: 2024-02-08

## 2024-02-12 NOTE — PROGRESS NOTES
PT Evaluation: CF Clinic    Patient Name:Kinjal Reyes  MRN:[id number]  Today's Date: 1/24/2024       PT met Kinjal in the CF clinic for the first time today. She was pleasant and agreeable to participate in an initial PT evaluation, to establish a baseline for exercise function and vital sign response to activity. Kinjal has preserved lung function (FEV1 at 92% today), and is reporting no issues with activity. 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  PT will plan to follow up with patient annually, or sooner if indicated.     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  Has 80 hour/week cap on working hours. Lives with boyfriend, has a dog, enjoys taking dog on walks/hikes when she has time.    Respiratory  What do you do for Airway clearance?  VEST 1-2x/week    Modulator Therapy: Trikafta                                         Exercise: current program  Hiking outdoors, walks  Is able to run 3-5 miles at a time  Walks dog approx 1x/day    FEV1 today: 92%  Baseline FEV1: 94%      Urinary Incontinence/Pelvic Floor  -Do you experience dribbling, with or without cough?  No    -The Knack: counter bracing technique. It involves jake the pelvic floor muscles before and during any incr in downward pressure on the pelvic floor    Vestibular:  No    Musculoskeletal    Posture: WFL    Standing lateral side bend: WFL    Standing trunk rotation, flexion, extension : WFL, equal on all sides      MMT:   R LE : 4/5 throughout  L LE : 4/5 throughout    Exercise Tests/Outcome Measures: RPD/RPE on all tests  Pre mobility: , spO2 97%    1)Sit to Stand  60 sec STS :   39x  , spO2 100%    2)   3 min Step test    Immediately post   HR : 155  O2 : 99%    AFTER 1 min rest:  HR : 126 bpm  O2 : 98%      3) Plank Hold  Total time: 1 min 4 sec    What body part  gave out first?  shoulders

## 2024-02-16 ENCOUNTER — TELEPHONE (OUTPATIENT)
Dept: PEDIATRIC PULMONOLOGY | Facility: HOSPITAL | Age: 29
End: 2024-02-16
Payer: COMMERCIAL

## 2024-02-16 ENCOUNTER — SPECIALTY PHARMACY (OUTPATIENT)
Dept: PHARMACY | Facility: CLINIC | Age: 29
End: 2024-02-16

## 2024-02-16 DIAGNOSIS — K21.9 GASTRIC REFLUX: Primary | ICD-10-CM

## 2024-02-16 PROCEDURE — RXMED WILLOW AMBULATORY MEDICATION CHARGE

## 2024-02-16 RX ORDER — PANTOPRAZOLE SODIUM 40 MG/1
40 TABLET, DELAYED RELEASE ORAL 2 TIMES DAILY
Qty: 60 TABLET | Refills: 1 | Status: SHIPPED | OUTPATIENT
Start: 2024-02-16 | End: 2024-04-12 | Stop reason: SDUPTHER

## 2024-02-16 NOTE — TELEPHONE ENCOUNTER
RN left message for the pt that med was switch. Requested a return call if she would like detail.      From: Chip Dwyer <[email or web address]>   Sent: Friday, February 16, 2024 1:59 PM  To: Britt Xie <[email or web address]>; Tami Brown <[email or web address]>; Michelle Guerra <[email or web address]>  Subject: RE: Specialty Pharmacy / Coverage Denial / Eric / [id number]    FYI this medication Pantoprazole is covered for the patient at a $0 copay.  I can call and set up delivery with the patient if you would like and she should have it by Tuesday.    From: Britt Xie <[email or web address]>   Sent: Friday, February 16, 2024 11:15 AM  To: Tami Brown <[email or web address]>; Michelle Guerra <[email or web address]>  Cc: Chip Dwyer <[email or web address]>  Subject: RE: Specialty Pharmacy / Coverage Denial / Eric / [id number]    Great ~ let me know if it is covered and I will give Kinjal a call.     Thank you,  Britt Xie RN  Adult Clinical Staff Coordinator  Department of Pediatric Pulmonology   [facility]  [facility]   [address] [place] [address], [address]  [place] [address]    [phone]  Fax [phone]        From: Tami Brown <[email or web address]>   Sent: Friday, February 16, 2024 10:20 AM  To: Michelle Guerra <[email or web address]>  Cc: Britt Xie <[email or web address]>; Chip Dwyer <[email or web address]>  Subject: RE: Specialty Pharmacy / Coverage Cristianial / Eric / [id number]    Thank you, Michelle. I sent the RX to  specialty. Chip do you mind letting me know if it goes through appropriately?    Thanks,  Tami    From: Michelle Guerra <[email or web address]>   Sent: Thursday, February 15, 2024  7:26 AM  To: Tami Brown <[email or web address]>  Cc: Britt Xie <[email or web address]>; Chip Dwyer <[email or web address]>  Subject: RE: Specialty Pharmacy / Coverage Denial / Eric / [id number]    Good morning,    I think that this is being denied because it is OTC. I'm guessing that she could switch to Protonix and that might be covered by the plan. If you want to send a script to the  Specialty pharmacy, they should be able to try that through the insurance and confirm. Then maybe Britt would have time to call Kinjal and tell her about the switch if we find out it is covered?    Thanks,  MannyMichelle    From: Tami Brown <[email or web address]>   Sent: Wednesday, February 14, 2024 9:47 AM  To: Michelle Guerra <[email or web address]>  Subject: FW: Specialty Pharmacy / Coverage Denial / Eric / [id number]    Hi Michelle,    Is there something I can do to help with this?    Kind RegardsTami    From: Chip Dwyer <[email or web address]>   Sent: Tuesday, February 13, 2024 8:28 AM  To: Tami Brown <[email or web address]>  Subject: Specialty Pharmacy / Coverage Denial / Eric / [id number]      Eric Kinjal              1995                         MRN  [id number]  Omeprazole      Hello                      The insurance plan has denied coverage for this patient's medication (the denial case number is #[id number]).  The denial notice from the insurance plan is attached for your reference, and contains the insurance plan's decision rationale.  If you would like specialty pharmacy assistance with the appeal process, please provide any supporting literature that you have and a short paragraph describing your clinical rationale to appeal this decision. Once we receive the literature and clinical rationale we can formulate a formal letter to help expedite the appeal process.”    Thank  jose Dwyer, [facility]   Patient Support Advocate  [facility], [place] [address]  Phone: [phone]  Fax: [phone]

## 2024-02-20 ENCOUNTER — SPECIALTY PHARMACY (OUTPATIENT)
Dept: PHARMACY | Facility: CLINIC | Age: 29
End: 2024-02-20

## 2024-02-21 ENCOUNTER — PHARMACY VISIT (OUTPATIENT)
Dept: PHARMACY | Facility: CLINIC | Age: 29
End: 2024-02-21
Payer: MEDICAID

## 2024-02-22 ENCOUNTER — PHARMACY VISIT (OUTPATIENT)
Dept: PHARMACY | Facility: CLINIC | Age: 29
End: 2024-02-22
Payer: MEDICAID

## 2024-02-28 DIAGNOSIS — E84.9 CYSTIC FIBROSIS (MULTI): ICD-10-CM

## 2024-02-28 DIAGNOSIS — A49.8 PSEUDOMONAS AERUGINOSA INFECTION: ICD-10-CM

## 2024-02-28 PROCEDURE — RXMED WILLOW AMBULATORY MEDICATION CHARGE

## 2024-02-28 RX ORDER — PEN NEEDLE, DIABETIC 31 GX5/16"
1 NEEDLE, DISPOSABLE MISCELLANEOUS 3 TIMES DAILY
Qty: 1 EACH | Refills: 0 | Status: CANCELLED | OUTPATIENT
Start: 2024-02-28

## 2024-02-28 RX ORDER — AZTREONAM 75 MG/ML
75 KIT INHALATION 3 TIMES DAILY
Qty: 84 ML | Refills: 0 | Status: CANCELLED | OUTPATIENT
Start: 2024-02-28 | End: 2024-03-27

## 2024-03-01 ENCOUNTER — PHARMACY VISIT (OUTPATIENT)
Dept: PHARMACY | Facility: CLINIC | Age: 29
End: 2024-03-01
Payer: MEDICAID

## 2024-03-12 DIAGNOSIS — A49.8 PSEUDOMONAS AERUGINOSA INFECTION: ICD-10-CM

## 2024-03-12 DIAGNOSIS — E84.9 CYSTIC FIBROSIS (MULTI): ICD-10-CM

## 2024-03-12 RX ORDER — AZTREONAM 75 MG/ML
75 KIT INHALATION 3 TIMES DAILY
Qty: 84 ML | Refills: 0 | Status: CANCELLED | OUTPATIENT
Start: 2024-02-28 | End: 2024-03-27

## 2024-03-12 RX ORDER — PEN NEEDLE, DIABETIC 31 GX5/16"
1 NEEDLE, DISPOSABLE MISCELLANEOUS 3 TIMES DAILY
Qty: 1 EACH | Refills: 0 | Status: CANCELLED | OUTPATIENT
Start: 2024-02-28

## 2024-03-13 LAB
ACID FAST STN SPEC: NORMAL
MYCOBACTERIUM SPEC CULT: NORMAL

## 2024-03-13 RX ORDER — LEVOFLOXACIN 750 MG/1
750 TABLET ORAL DAILY
Qty: 14 TABLET | Refills: 0 | OUTPATIENT
Start: 2024-03-13 | End: 2024-03-27

## 2024-03-15 ENCOUNTER — PHARMACY VISIT (OUTPATIENT)
Dept: PHARMACY | Facility: CLINIC | Age: 29
End: 2024-03-15
Payer: MEDICAID

## 2024-03-15 DIAGNOSIS — A49.8 PSEUDOMONAS AERUGINOSA INFECTION: ICD-10-CM

## 2024-03-15 DIAGNOSIS — E84.9 CYSTIC FIBROSIS (MULTI): ICD-10-CM

## 2024-03-15 PROCEDURE — RXMED WILLOW AMBULATORY MEDICATION CHARGE

## 2024-03-15 RX ORDER — PEN NEEDLE, DIABETIC 31 GX5/16"
1 NEEDLE, DISPOSABLE MISCELLANEOUS 3 TIMES DAILY
Qty: 1 EACH | Refills: 0 | Status: SHIPPED | OUTPATIENT
Start: 2024-03-15 | End: 2024-05-25 | Stop reason: SDUPTHER

## 2024-03-19 ENCOUNTER — TELEMEDICINE (OUTPATIENT)
Dept: PEDIATRIC ENDOCRINOLOGY | Facility: HOSPITAL | Age: 29
End: 2024-03-19
Payer: COMMERCIAL

## 2024-03-19 DIAGNOSIS — E08.9 DIABETES MELLITUS RELATED TO CYSTIC FIBROSIS (MULTI): ICD-10-CM

## 2024-03-19 DIAGNOSIS — E84.8 DIABETES MELLITUS RELATED TO CYSTIC FIBROSIS (MULTI): ICD-10-CM

## 2024-03-19 DIAGNOSIS — E84.8 EXOCRINE PANCREATIC MANIFESTATION OF CYSTIC FIBROSIS (MULTI): Primary | ICD-10-CM

## 2024-03-19 NOTE — PROGRESS NOTES
Virtual or Telephone Consent    An interactive audio and video telecommunication system which permits real time communications between the patient (at the originating site) and provider (at the distant site) was utilized to provide this telehealth service.   Verbal consent was requested and obtained from Kinjal Reyes on this date 3/19/24 for a telehealth visit.     Subjective    Kinjal Reyes is a 28 y.o. year old with a history of Cystic Fibrosis (Delta F508 homozygote), exocrine pancreatic insufficiency, presenting for follow up of Cystic Fibrosis-Related diabetes (CFRD).   CFRD was diagnosed in May 2009  A1C at last visit was   Lab Results   Component Value Date    HGBA1C 5.2 01/24/2024        HPI   INTERVAL HISTORY  Last seen Feb 2021. Has been doing well since then. Continues on Levemir every morning. Checks some BG.    BG trends:   The patient is currently checking the blood glucose.  Patient is using: glucometer  BG numbers in the 90s.  Someties checks after a meal--sometimes 120s, lot of time in the 80s  Highest is 150s.   Checking about 2 hours after eating    Diabetes is treated with:  Insulin Instructions  Fixed Dose Injections   Levemir FlexPen 100 unit/mL (3 mL) insulin pen   Last edited by Senia Matthews MD on 3/19/2024 at 9:13 AM      Time of Day Dose (units)   6:00 6      Hyperglycemia:  Symptoms: no polyuria, polydipsia, nocturia    Hypoglycemia:   Hypoglycemia frequency: sometimes  Hypoglycemia awareness: Yes   Symptoms of Hypoglycemia: jitteriness and weak or shaky  Timing of Hypoglycemia: After meals---simple carbs--cereal, crackers  Treats hypoglycemia with: Juice and Candy or pop  Glucagon prescription:  No, will Rx Baqsimi    Nutrition and Pulmonary review:  Diet Hx:   Weight: unchanged  Pulmonary status: Stable    SCREENING FOR COMPLICATIONS:   Urine albumin:  normal May 2023  Eye doctor: fundoscopic exam normal    No family history on file.   The patient does not have a known family  history of diabetes.    Social History     Tobacco Use    Smoking status: Never    Smokeless tobacco: Never        Objective   PHYSICAL EXAM:  There were no vitals taken for this visit.   General: Well nourished, no acute distress  HEENT: NCAT, MMM, eye movements grossly intact  Neck: Supple  Pulm: Non labored breathing  Skin: No visible rash  MSK: normal ROM  Neuro: CN grossly intact  Psych: alert, normal mood     LABS:  Hemoglobin A1C   Date/Time Value Ref Range Status   01/24/2024 12:38 PM 5.2 see below % Final   05/08/2023 12:24 PM 5.2 % Final     Comment:          Diagnosis of Diabetes-Adults   Non-Diabetic: < or = 5.6%   Increased risk for developing diabetes: 5.7-6.4%   Diagnostic of diabetes: > or = 6.5%  .       Monitoring of Diabetes                Age (y)     Therapeutic Goal (%)   Adults:          >18           <7.0   Pediatrics:    13-18           <7.5                   7-12           <8.0                   0- 6            7.5-8.5   American Diabetes Association. Diabetes Care 33(S1), Jan 2010.     09/17/2022 09:02 AM 5.1 4.7 - 5.6 % Final   12/23/2020 10:58 AM 5.7 (H) 4.7 - 5.6 % Final   05/14/2020 03:08 PM 5.0 4.7 - 5.6 % Final     Albumin/Creatine Ratio   Date/Time Value Ref Range Status   05/08/2023 12:40 PM SEE COMMENT 0.0 - 30.0 ug/mg crt Final     Comment:     One or more analytes used in this calculation   is outside of the analytical measurement range.  Calculation cannot be performed.       Cholesterol   Date/Time Value Ref Range Status   05/08/2023 12:24  0 - 199 mg/dL Final     Comment:     .      AGE      DESIRABLE   BORDERLINE HIGH   HIGH     0-19 Y     0 - 169       170 - 199     >/= 200    20-24 Y     0 - 189       190 - 224     >/= 225         >24 Y     0 - 199       200 - 239     >/= 240   **All ranges are based on fasting samples. Specific   therapeutic targets will vary based on patient-specific   cardiac risk.  .   Pediatric guidelines reference:Pediatrics 2011, 128(S5).    Adult guidelines reference: NCEP ATPIII Guidelines,     TOMA 2001, 258:2486-97  .   Venipuncture immediately after or during the    administration of Metamizole may lead to falsely   low results. Testing should be performed immediately   prior to Metamizole dosing.       LDL   Date/Time Value Ref Range Status   05/08/2023 12:24 PM 44 0 - 99 mg/dL Final     Comment:     .                           NEAR      BORD      AGE      DESIRABLE  OPTIMAL    HIGH     HIGH     VERY HIGH     0-19 Y     0 - 109     ---    110-129   >/= 130     ----    20-24 Y     0 - 119     ---    120-159   >/= 160     ----      >24 Y     0 -  99   100-129  130-159   160-189     >/=190  .       HDL   Date/Time Value Ref Range Status   05/08/2023 12:24 PM 57.1 mg/dL Final     Comment:     .      AGE      VERY LOW   LOW     NORMAL    HIGH       0-19 Y       < 35   < 40     40-45     ----    20-24 Y       ----   < 40       >45     ----      >24 Y       ----   < 40     40-60      >60  .       Triglycerides   Date/Time Value Ref Range Status   05/08/2023 12:24  (H) 0 - 149 mg/dL Final     Comment:     .      AGE      DESIRABLE   BORDERLINE HIGH   HIGH     VERY HIGH   0 D-90 D    19 - 174         ----         ----        ----  91 D- 9 Y     0 -  74        75 -  99     >/= 100      ----    10-19 Y     0 -  89        90 - 129     >/= 130      ----    20-24 Y     0 - 114       115 - 149     >/= 150      ----         >24 Y     0 - 149       150 - 199    200- 499    >/= 500  .   Venipuncture immediately after or during the    administration of Metamizole may lead to falsely   low results. Testing should be performed immediately   prior to Metamizole dosing.       FEV1   Date/Time Value Ref Range Status   01/24/2024 09:15 AM 2.97 liters Final     Comment:     92%        GLUCOSE DATA:  Reported above.      ASSESSMENT/PLAN:   Assessment/Plan   Problem List Items Addressed This Visit             ICD-10-CM    Exocrine pancreatic manifestation of cystic  "fibrosis (CMS/[facility]) - Primary E84.8    Relevant Medications    insulin glargine (Lantus) 100 unit/mL (3 mL) pen    BD Ultra-Fine Bea Pen Needle 32 gauge x 5/32\" needle    blood-glucose meter misc    blood sugar diagnostic strip    lancets (OneTouch Delica Plus Lancet) 33 gauge misc    blood-glucose sensor (Dexcom G7 Sensor) device    Diabetes mellitus related to cystic fibrosis (CMS/[facility]) E84.8, E08.9     29 yo female with CF, pancreatic insufficiency, CFRD with stable glucose control on daily levemir. Most recent A1c 5.2%. Reported blood glucoses in target range.  Levemir is being discontinued so we will switch to glargine.   Screening labs UTD. Sees ophtho annually.     Recommend:  --change to glargine 6 units daily. Check BG fasting, prior to bed and in middle of the night for a few days after switching insulins. Discussed the risk of overnight hypoglycemia  --Investigate insurance coverage for CGM--Kinjal is interested if covered  --continue annual urine albumin and retinal exams  --Send refills for testing supplies  --Rx glucagon (Baqsimi)  --should repeat A1c Q3-6 months  --follow up in 3 months         Relevant Medications    insulin glargine (Lantus) 100 unit/mL (3 mL) pen    BD Ultra-Fine Bea Pen Needle 32 gauge x 5/32\" needle    blood-glucose meter misc    blood sugar diagnostic strip    lancets (OneTouch Delica Plus Lancet) 33 gauge misc    blood-glucose sensor (Dexcom G7 Sensor) device         Insulin Instructions  Fixed Dose Injections   insulin glargine 100 unit/mL (3 mL) pen (Lantus)   Last edited by Senia Matthews MD on 3/20/2024 at 1:33 AM      Time of Day Dose (units)   6:00 6         "

## 2024-03-20 ENCOUNTER — PATIENT MESSAGE (OUTPATIENT)
Dept: PEDIATRIC PULMONOLOGY | Facility: HOSPITAL | Age: 29
End: 2024-03-20
Payer: COMMERCIAL

## 2024-03-20 DIAGNOSIS — E84.9 CYSTIC FIBROSIS (MULTI): ICD-10-CM

## 2024-03-20 PROBLEM — Z79.4 DIABETES MELLITUS DUE TO UNDERLYING CONDITION WITHOUT COMPLICATION, WITH LONG-TERM CURRENT USE OF INSULIN (MULTI): Status: ACTIVE | Noted: 2024-03-20

## 2024-03-20 PROBLEM — E84.8 DIABETES MELLITUS RELATED TO CYSTIC FIBROSIS (MULTI): Status: ACTIVE | Noted: 2024-03-20

## 2024-03-20 PROBLEM — E84.8 EXOCRINE PANCREATIC MANIFESTATION OF CYSTIC FIBROSIS (MULTI): Status: ACTIVE | Noted: 2024-03-20

## 2024-03-20 PROBLEM — E08.9 DIABETES MELLITUS DUE TO UNDERLYING CONDITION WITHOUT COMPLICATION, WITH LONG-TERM CURRENT USE OF INSULIN (MULTI): Status: ACTIVE | Noted: 2024-03-20

## 2024-03-20 RX ORDER — LANCETS
EACH MISCELLANEOUS
Qty: 100 EACH | Refills: 11 | Status: SHIPPED | OUTPATIENT
Start: 2024-03-20

## 2024-03-20 RX ORDER — BLOOD-GLUCOSE SENSOR
EACH MISCELLANEOUS
Qty: 3 EACH | Refills: 11 | Status: SHIPPED | OUTPATIENT
Start: 2024-03-20

## 2024-03-20 RX ORDER — DEXTROSE 4 G
TABLET,CHEWABLE ORAL
Qty: 1 EACH | Refills: 0 | Status: SHIPPED | OUTPATIENT
Start: 2024-03-20 | End: 2024-04-19 | Stop reason: SDUPTHER

## 2024-03-20 RX ORDER — PEN NEEDLE, DIABETIC 31 GX5/16"
NEEDLE, DISPOSABLE MISCELLANEOUS
Qty: 50 EACH | Refills: 11 | Status: SHIPPED | OUTPATIENT
Start: 2024-03-20

## 2024-03-20 RX ORDER — INSULIN GLARGINE 100 [IU]/ML
INJECTION, SOLUTION SUBCUTANEOUS
Qty: 15 ML | Refills: 12 | Status: SHIPPED | OUTPATIENT
Start: 2024-03-20

## 2024-03-20 NOTE — ASSESSMENT & PLAN NOTE
29 yo female with CF, pancreatic insufficiency, CFRD with stable glucose control on daily levemir. Most recent A1c 5.2%. Reported blood glucoses in target range.  Levemir is being discontinued so we will switch to glargine.   Screening labs UTD. Sees ophtho annually.     Recommend:  --change to glargine 6 units daily. Check BG fasting, prior to bed and in middle of the night for a few days after switching insulins. Discussed the risk of overnight hypoglycemia  --Investigate insurance coverage for CGM--Kinjal is interested if covered  --continue annual urine albumin and retinal exams  --Send refills for testing supplies  --Rx glucagon (Baqsimi)  --should repeat A1c Q3-6 months  --follow up in 3 months

## 2024-03-25 DIAGNOSIS — E84.9 CYSTIC FIBROSIS (MULTI): ICD-10-CM

## 2024-03-25 PROCEDURE — 87070 CULTURE OTHR SPECIMN AEROBIC: CPT | Performed by: STUDENT IN AN ORGANIZED HEALTH CARE EDUCATION/TRAINING PROGRAM

## 2024-03-25 PROCEDURE — 87102 FUNGUS ISOLATION CULTURE: CPT | Performed by: STUDENT IN AN ORGANIZED HEALTH CARE EDUCATION/TRAINING PROGRAM

## 2024-03-25 PROCEDURE — 87116 MYCOBACTERIA CULTURE: CPT | Performed by: STUDENT IN AN ORGANIZED HEALTH CARE EDUCATION/TRAINING PROGRAM

## 2024-03-27 LAB
FUNGUS SPEC CULT: ABNORMAL
FUNGUS SPEC FUNGUS STN: ABNORMAL

## 2024-03-30 LAB — BACTERIA SPT CF RESP CULT: NORMAL

## 2024-04-02 ENCOUNTER — SPECIALTY PHARMACY (OUTPATIENT)
Dept: PHARMACY | Facility: CLINIC | Age: 29
End: 2024-04-02

## 2024-04-02 PROCEDURE — RXMED WILLOW AMBULATORY MEDICATION CHARGE

## 2024-04-04 ENCOUNTER — SPECIALTY PHARMACY (OUTPATIENT)
Dept: PHARMACY | Facility: CLINIC | Age: 29
End: 2024-04-04

## 2024-04-05 ENCOUNTER — PHARMACY VISIT (OUTPATIENT)
Dept: PHARMACY | Facility: CLINIC | Age: 29
End: 2024-04-05
Payer: MEDICAID

## 2024-04-07 PROCEDURE — RXMED WILLOW AMBULATORY MEDICATION CHARGE

## 2024-04-12 DIAGNOSIS — K21.9 GASTRIC REFLUX: ICD-10-CM

## 2024-04-13 RX ORDER — PANTOPRAZOLE SODIUM 40 MG/1
40 TABLET, DELAYED RELEASE ORAL 2 TIMES DAILY
Qty: 60 TABLET | Refills: 11 | Status: SHIPPED | OUTPATIENT
Start: 2024-04-13 | End: 2025-04-13

## 2024-04-15 ENCOUNTER — SPECIALTY PHARMACY (OUTPATIENT)
Dept: PHARMACY | Facility: CLINIC | Age: 29
End: 2024-04-15

## 2024-04-15 ENCOUNTER — PHARMACY VISIT (OUTPATIENT)
Dept: PHARMACY | Facility: CLINIC | Age: 29
End: 2024-04-15
Payer: MEDICAID

## 2024-04-15 PROCEDURE — RXMED WILLOW AMBULATORY MEDICATION CHARGE

## 2024-04-16 DIAGNOSIS — E84.9 CYSTIC FIBROSIS (MULTI): ICD-10-CM

## 2024-04-16 RX ORDER — AZTREONAM 75 MG/ML
75 KIT INHALATION 3 TIMES DAILY
Qty: 84 ML | Refills: 0 | Status: CANCELLED | OUTPATIENT
Start: 2024-04-16 | End: 2024-05-14

## 2024-04-19 ENCOUNTER — PHARMACY VISIT (OUTPATIENT)
Dept: PHARMACY | Facility: CLINIC | Age: 29
End: 2024-04-19
Payer: COMMERCIAL

## 2024-04-19 DIAGNOSIS — E08.9 DIABETES MELLITUS RELATED TO CYSTIC FIBROSIS (MULTI): ICD-10-CM

## 2024-04-19 DIAGNOSIS — E84.8 DIABETES MELLITUS RELATED TO CYSTIC FIBROSIS (MULTI): ICD-10-CM

## 2024-04-19 DIAGNOSIS — E84.8 EXOCRINE PANCREATIC MANIFESTATION OF CYSTIC FIBROSIS (MULTI): ICD-10-CM

## 2024-04-19 PROCEDURE — RXMED WILLOW AMBULATORY MEDICATION CHARGE

## 2024-04-19 RX ORDER — DEXTROSE 4 G
TABLET,CHEWABLE ORAL
Qty: 1 EACH | Refills: 0 | Status: SHIPPED | OUTPATIENT
Start: 2024-04-19

## 2024-04-26 PROCEDURE — RXMED WILLOW AMBULATORY MEDICATION CHARGE

## 2024-04-29 PROCEDURE — RXMED WILLOW AMBULATORY MEDICATION CHARGE

## 2024-04-30 ENCOUNTER — PHARMACY VISIT (OUTPATIENT)
Dept: PHARMACY | Facility: CLINIC | Age: 29
End: 2024-04-30
Payer: MEDICAID

## 2024-05-02 ENCOUNTER — SPECIALTY PHARMACY (OUTPATIENT)
Dept: PHARMACY | Facility: CLINIC | Age: 29
End: 2024-05-02

## 2024-05-03 DIAGNOSIS — E84.9 CYSTIC FIBROSIS (MULTI): ICD-10-CM

## 2024-05-03 RX ORDER — AZTREONAM 75 MG/ML
75 KIT INHALATION 3 TIMES DAILY
Qty: 84 ML | Refills: 0 | Status: CANCELLED | OUTPATIENT
Start: 2024-04-16 | End: 2024-05-14

## 2024-05-06 DIAGNOSIS — E84.9 CYSTIC FIBROSIS (MULTI): ICD-10-CM

## 2024-05-06 RX ORDER — AZTREONAM 75 MG/ML
75 KIT INHALATION 3 TIMES DAILY
Qty: 84 ML | Refills: 0 | Status: CANCELLED | OUTPATIENT
Start: 2024-04-16 | End: 2024-05-14

## 2024-05-08 PROCEDURE — RXMED WILLOW AMBULATORY MEDICATION CHARGE

## 2024-05-13 DIAGNOSIS — E84.9 CYSTIC FIBROSIS (MULTI): ICD-10-CM

## 2024-05-15 LAB
ACID FAST STN SPEC: NORMAL
MYCOBACTERIUM SPEC CULT: NORMAL

## 2024-05-17 DIAGNOSIS — E84.9 CYSTIC FIBROSIS (MULTI): ICD-10-CM

## 2024-05-17 PROCEDURE — RXMED WILLOW AMBULATORY MEDICATION CHARGE

## 2024-05-17 RX ORDER — AZTREONAM 75 MG/ML
75 KIT INHALATION 3 TIMES DAILY
Qty: 84 ML | Refills: 0 | Status: CANCELLED | OUTPATIENT
Start: 2024-05-17 | End: 2024-06-14

## 2024-05-20 NOTE — ADDENDUM NOTE
Encounter addended by: Riley Mercedes, RRT on: 5/20/2024 5:26 PM   Actions taken: Charge Capture section accepted

## 2024-05-21 ENCOUNTER — PHARMACY VISIT (OUTPATIENT)
Dept: PHARMACY | Facility: CLINIC | Age: 29
End: 2024-05-21
Payer: MEDICAID

## 2024-05-24 RX ORDER — ELEXACAFTOR, TEZACAFTOR, AND IVACAFTOR 100-50-75
KIT ORAL
Qty: 84 EACH | Refills: 11 | Status: CANCELLED | OUTPATIENT
Start: 2024-05-24 | End: 2025-05-24

## 2024-05-25 PROBLEM — J45.909 ASTHMA (HHS-HCC): Status: ACTIVE | Noted: 2023-05-08

## 2024-05-25 PROBLEM — E55.9 VITAMIN D DEFICIENCY: Status: ACTIVE | Noted: 2023-05-08

## 2024-05-25 PROBLEM — D64.9 ANEMIA: Status: ACTIVE | Noted: 2024-05-25

## 2024-05-25 PROBLEM — E84.8 EXOCRINE PANCREATIC MANIFESTATION OF CYSTIC FIBROSIS (MULTI): Status: ACTIVE | Noted: 2023-05-08

## 2024-05-25 PROBLEM — A49.8 PSEUDOMONAS AERUGINOSA INFECTION: Status: ACTIVE | Noted: 2024-05-25

## 2024-05-25 PROBLEM — A49.02 MRSA (METHICILLIN RESISTANT STAPHYLOCOCCUS AUREUS): Status: ACTIVE | Noted: 2024-05-25

## 2024-05-29 ENCOUNTER — SPECIALTY PHARMACY (OUTPATIENT)
Dept: PHARMACY | Facility: CLINIC | Age: 29
End: 2024-05-29

## 2024-05-29 PROCEDURE — RXMED WILLOW AMBULATORY MEDICATION CHARGE

## 2024-05-30 ENCOUNTER — PHARMACY VISIT (OUTPATIENT)
Dept: PHARMACY | Facility: CLINIC | Age: 29
End: 2024-05-30
Payer: MEDICAID

## 2024-05-31 DIAGNOSIS — E84.9 CYSTIC FIBROSIS (MULTI): ICD-10-CM

## 2024-05-31 NOTE — TELEPHONE ENCOUNTER
Pulmonary Attending  Cystic Fibrosis Service    Requested Prescriptions     Signed Prescriptions Disp Refills    elexacaftor-tezacaftor-ivacaft (Trikafta) 100-50-75 mg tablet 84 each 11     Sig: TAKE TWO (2) ORANGE TABLETS BY MOUTH EVERY MORNING AND ONE (1) BLUE TABLET BY MOUTH EVERY EVENING ABOUT 12 HOURS APART. TAKE WITH FATTY FOODS     Authorizing Provider: BASSAM SAUCEDO      [facility]  [address] [place], [place] [address]  Phone: [phone] Fax: [phone]    Bassam Saucedo MD  5/31/2024  3:47 PM

## 2024-06-03 ENCOUNTER — SPECIALTY PHARMACY (OUTPATIENT)
Dept: PHARMACY | Facility: CLINIC | Age: 29
End: 2024-06-03

## 2024-06-03 ENCOUNTER — PHARMACY VISIT (OUTPATIENT)
Dept: PHARMACY | Facility: CLINIC | Age: 29
End: 2024-06-03
Payer: COMMERCIAL

## 2024-06-03 PROCEDURE — RXMED WILLOW AMBULATORY MEDICATION CHARGE

## 2024-06-13 PROCEDURE — RXMED WILLOW AMBULATORY MEDICATION CHARGE

## 2024-06-14 ENCOUNTER — PHARMACY VISIT (OUTPATIENT)
Dept: PHARMACY | Facility: CLINIC | Age: 29
End: 2024-06-14
Payer: MEDICAID

## 2024-06-24 PROCEDURE — RXMED WILLOW AMBULATORY MEDICATION CHARGE

## 2024-06-25 ENCOUNTER — PHARMACY VISIT (OUTPATIENT)
Dept: PHARMACY | Facility: CLINIC | Age: 29
End: 2024-06-25
Payer: COMMERCIAL

## 2024-06-27 PROCEDURE — RXMED WILLOW AMBULATORY MEDICATION CHARGE

## 2024-06-29 ENCOUNTER — PHARMACY VISIT (OUTPATIENT)
Dept: PHARMACY | Facility: CLINIC | Age: 29
End: 2024-06-29
Payer: MEDICAID

## 2024-07-08 PROCEDURE — RXMED WILLOW AMBULATORY MEDICATION CHARGE

## 2024-07-09 ENCOUNTER — PHARMACY VISIT (OUTPATIENT)
Dept: PHARMACY | Facility: CLINIC | Age: 29
End: 2024-07-09
Payer: MEDICAID

## 2024-07-15 PROCEDURE — RXMED WILLOW AMBULATORY MEDICATION CHARGE

## 2024-07-16 ENCOUNTER — PHARMACY VISIT (OUTPATIENT)
Dept: PHARMACY | Facility: CLINIC | Age: 29
End: 2024-07-16
Payer: MEDICAID

## 2024-07-22 ENCOUNTER — APPOINTMENT (OUTPATIENT)
Dept: PRIMARY CARE | Facility: CLINIC | Age: 29
End: 2024-07-22
Payer: COMMERCIAL

## 2024-07-22 VITALS
HEIGHT: 64 IN | WEIGHT: 132.4 LBS | SYSTOLIC BLOOD PRESSURE: 131 MMHG | BODY MASS INDEX: 22.61 KG/M2 | DIASTOLIC BLOOD PRESSURE: 95 MMHG | OXYGEN SATURATION: 97 % | HEART RATE: 105 BPM

## 2024-07-22 DIAGNOSIS — Z00.00 ROUTINE GENERAL MEDICAL EXAMINATION AT A HEALTH CARE FACILITY: Primary | ICD-10-CM

## 2024-07-22 DIAGNOSIS — Z23 NEED FOR PNEUMOCOCCAL 20-VALENT CONJUGATE VACCINATION: ICD-10-CM

## 2024-07-22 PROBLEM — D64.9 ANEMIA: Status: RESOLVED | Noted: 2024-05-25 | Resolved: 2024-07-22

## 2024-07-22 PROCEDURE — 99395 PREV VISIT EST AGE 18-39: CPT | Performed by: STUDENT IN AN ORGANIZED HEALTH CARE EDUCATION/TRAINING PROGRAM

## 2024-07-22 PROCEDURE — 1036F TOBACCO NON-USER: CPT | Performed by: STUDENT IN AN ORGANIZED HEALTH CARE EDUCATION/TRAINING PROGRAM

## 2024-07-22 PROCEDURE — 3075F SYST BP GE 130 - 139MM HG: CPT | Performed by: STUDENT IN AN ORGANIZED HEALTH CARE EDUCATION/TRAINING PROGRAM

## 2024-07-22 PROCEDURE — 3008F BODY MASS INDEX DOCD: CPT | Performed by: STUDENT IN AN ORGANIZED HEALTH CARE EDUCATION/TRAINING PROGRAM

## 2024-07-22 PROCEDURE — 90677 PCV20 VACCINE IM: CPT | Performed by: STUDENT IN AN ORGANIZED HEALTH CARE EDUCATION/TRAINING PROGRAM

## 2024-07-22 PROCEDURE — 90471 IMMUNIZATION ADMIN: CPT | Performed by: STUDENT IN AN ORGANIZED HEALTH CARE EDUCATION/TRAINING PROGRAM

## 2024-07-22 PROCEDURE — 3080F DIAST BP >= 90 MM HG: CPT | Performed by: STUDENT IN AN ORGANIZED HEALTH CARE EDUCATION/TRAINING PROGRAM

## 2024-07-22 PROCEDURE — 3044F HG A1C LEVEL LT 7.0%: CPT | Performed by: STUDENT IN AN ORGANIZED HEALTH CARE EDUCATION/TRAINING PROGRAM

## 2024-07-22 PROCEDURE — RXMED WILLOW AMBULATORY MEDICATION CHARGE

## 2024-07-22 RX ORDER — BISMUTH SUBSALICYLATE 262 MG
1 TABLET,CHEWABLE ORAL DAILY
COMMUNITY

## 2024-07-22 NOTE — PROGRESS NOTES
Kinjal Reyes is a 27 y/o female who presents today to establish care. She has no acute complaints and states that she is here to establish care as her last PCP visit was two year ago.    Patient ID: Kinjal Reyes is a 27 y/o f who presents for establishment of primary care.    Last Physical: about 2 years ago     Pt's PMH, PSH, SH, FH , meds and allergies was obtained / reviewed and updated .     PMH  Cystic Fibrosis (4-5 hospitalizations related to CF)  Diabetes secondary to pancreatic insuffieincy from cystic fibrosis  Asthma  GERD    PSH  None    SH  Lives in an apartment with her boyfriend. 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    Medications  Trikafta  Creon (pancreatic enzymes)  Dulara  Albuterol  Zyrtec  Pantoprazole  Birth Control (Kati)    Dental visits :   Vision issues : N  Hearing issues : N    Immunizations : Y    Diet :    Exercise:  Weight concerns: none    Alcohol: occasional drinker  Tobacco: none  Recreational drug use : none    ======================================================  Vitals:    07/22/24 0910   BP: (!) 131/95   Pulse:    SpO2:      =====================================    Review of systems:  Constitutional: no chills, no fever and no night sweats.     Eyes: no blurred vision and no eyesight problems.     ENT: no hearing loss, no nasal congestion, no nasal discharge, no hoarseness and no sore throat.     Cardiovascular: no chest pain, no intermittent leg claudication, no lower extremity edema, no palpitations and no syncope.     Respiratory: no cough, no shortness of breath during exertion, no shortness of breath at rest and no wheezing.     Gastrointestinal: no abdominal pain, no blood in stools, no constipation, no diarrhea, no melena, no nausea, no rectal pain and no vomiting.     Genitourinary: no dysuria, no change in urinary frequency, no urinary hesitancy, no feelings of urinary urgency and no vaginal discharge.     Musculoskeletal: no arthralgias, no back pain  "and no myalgias.     Integumentary: no new skin lesions and no rashes.     Neurological: no difficulty walking, no headache, no limb weakness, no numbness and no tingling.     Psychiatric: no anxiety, no depression, no anhedonia and no substance use disorders.   ============================================================    Physical exam :    BP (!) 131/95   Pulse 105   Ht 1.623 m (5' 3.9\")   Wt 60.1 kg (132 lb 6.4 oz)   LMP 07/21/2024   SpO2 97%   BMI 22.80 kg/m²     Constitutional: Alert and in no acute distress. Well developed, well nourished.     Eyes: Normal external exam. Pupils were equal in size, round, reactive to light (PERRL) with normal accommodation and extraocular movements intact (EOMI).     Ears, Nose, Mouth, and Throat: External inspection of ears and nose: Normal.  Otoscopic examination: Normal.      Neck: No neck mass was observed. Supple.     Cardiovascular: Heart rate and rhythm were normal, normal S1 and S2, no gallops, no murmurs and no pericardial rub    Pulmonary: No respiratory distress. Clear bilateral breath sounds.     Abdomen: Soft nontender; no abdominal mass palpated. No organomegaly.     Musculoskeletal: No joint swelling seen, normal movements of all extremities. Range of motion: Normal.  Muscle strength/tone: Normal.      Neurologic: Deep tendon reflexes were 2+ and symmetric. Sensation: Normal.     Psychiatric: Judgment and insight: Intact. Mood and affect: Normal.        Assessment and Plan  Problem List Items Addressed This Visit    None  Visit Diagnoses         Codes    Routine general medical examination at a health care facility    -  Primary Z00.00    Relevant Orders    TSH with reflex to Free T4 if abnormal    Lipid Panel    Need for pneumococcal 20-valent conjugate vaccination     Z23    Relevant Orders    Pneumococcal conjugate vaccine, 20-valent (PREVNAR 20) (Completed)        29 y/o female with CF , Dm1 secondary to pancreatic insuff presents to [facility] care     Est " with pulm and endo   DBP elevated including the rpt   Advised ABP checks, log and RTO in 4-6m   Salt watch advised     HM :   Vaccines: [id number]  given CF   Recommended  RSV vaccination     -Tdap: patient endorsed 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 (within ten years)  -Flu: in the fall  -Shingles: at age 50     Cancer screenings:  -Mammogram: starting at 40  -Colonoscopy: starting at age 45  -PAP: last PAP in 2019, to fu with gyn     General preventive care discussed which includes regular exercise, healthy eating habits, to include more of plant based diet, to include foods of all colors  , limiting excessive red meat intake , staying active to maintain a weight that is appropriate for his/ her height / making efforts to reach an ideal weight for height,  avoidance of smoking, excess alcohol consumption, avoidance of recreational drugs, safe and responsible sexual relationships and practices.     Calcium + Vit D supplements recommended   Regular exercise recommended   Healthy eating choices discussed and recommended   BMI ( Body mass index ) discussed and encouraged weight loss through the above discussed lifestyle modificati    Conditions addressed and mgmt as noted above.  Pertinent labs, images/ imaging reports , chart review was done .   Age appropriate labs / labs for mgmt of chronic medical conditions ordered, further mgmt pending the results.       RTO in 4-6m for BP check if ABPs are elevated

## 2024-07-23 ENCOUNTER — PHARMACY VISIT (OUTPATIENT)
Dept: PHARMACY | Facility: CLINIC | Age: 29
End: 2024-07-23
Payer: COMMERCIAL

## 2024-07-25 ENCOUNTER — APPOINTMENT (OUTPATIENT)
Dept: OBSTETRICS AND GYNECOLOGY | Facility: CLINIC | Age: 29
End: 2024-07-25
Payer: COMMERCIAL

## 2024-07-25 ENCOUNTER — OFFICE VISIT (OUTPATIENT)
Dept: OBSTETRICS AND GYNECOLOGY | Facility: CLINIC | Age: 29
End: 2024-07-25
Payer: COMMERCIAL

## 2024-07-25 VITALS
WEIGHT: 129 LBS | HEIGHT: 63 IN | BODY MASS INDEX: 22.86 KG/M2 | DIASTOLIC BLOOD PRESSURE: 80 MMHG | SYSTOLIC BLOOD PRESSURE: 130 MMHG

## 2024-07-25 DIAGNOSIS — Z01.419 WELL WOMAN EXAM: ICD-10-CM

## 2024-07-25 DIAGNOSIS — Z30.41 ENCOUNTER FOR SURVEILLANCE OF CONTRACEPTIVE PILLS: ICD-10-CM

## 2024-07-25 PROCEDURE — 3075F SYST BP GE 130 - 139MM HG: CPT | Performed by: OBSTETRICS & GYNECOLOGY

## 2024-07-25 PROCEDURE — 3008F BODY MASS INDEX DOCD: CPT | Performed by: OBSTETRICS & GYNECOLOGY

## 2024-07-25 PROCEDURE — 1036F TOBACCO NON-USER: CPT | Performed by: OBSTETRICS & GYNECOLOGY

## 2024-07-25 PROCEDURE — 3079F DIAST BP 80-89 MM HG: CPT | Performed by: OBSTETRICS & GYNECOLOGY

## 2024-07-25 PROCEDURE — 88175 CYTOPATH C/V AUTO FLUID REDO: CPT

## 2024-07-25 PROCEDURE — 87624 HPV HI-RISK TYP POOLED RSLT: CPT

## 2024-07-25 PROCEDURE — 3044F HG A1C LEVEL LT 7.0%: CPT | Performed by: OBSTETRICS & GYNECOLOGY

## 2024-07-25 PROCEDURE — 99385 PREV VISIT NEW AGE 18-39: CPT | Performed by: OBSTETRICS & GYNECOLOGY

## 2024-07-25 RX ORDER — NORGESTIMATE AND ETHINYL ESTRADIOL 0.25-0.035
1 KIT ORAL DAILY
Qty: 84 TABLET | Refills: 3 | Status: SHIPPED | OUTPATIENT
Start: 2024-07-25

## 2024-07-25 ASSESSMENT — PAIN SCALES - GENERAL: PAINLEVEL: 0-NO PAIN

## 2024-07-25 NOTE — PROGRESS NOTES
"Kinjal Reyes is a 28 y.o. female who is here for a routine exam. 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    Menses : Monthly with OCP  Contraception : OCP  HPV vaccine : Yes  Last pap : 2019 normal  Last HPV : Never  History of abnormal pap : No  Last mammogram : Never  History of abnormal mammogram : No  Colon cancer screen : Never    ROS  systems reviewed, anything negative noted in HPI    bladder: no dysuria, gross hematuria, urinary frequency, urgency or incontinence  breast: no lumps, nipple d/c, overlying skin changes, redness, or skin retraction    [unfilled]    Past med hx and past surg hx reviewed and notable for:     Objective   /80   Ht 1.6 m (5' 3\")   Wt 58.5 kg (129 lb)   LMP 07/22/2024   BMI 22.85 kg/m²      General:   Alert and oriented, in no acute distress   Neck: Supple. No visible thyromegaly.    Breast/Axilla: Normal to palpation bilaterally without masses, skin changes, lymphadenopathy, or nipple discharge.    Abdomen: Soft, non-tender, without masses or organomegaly   Vulva: Normal architecture without erythema, masses, or lesions.    Vagina: Normal mucosa without lesions, masses, or atrophy. No abnormal vaginal discharge.    Cervix: Normal without masses, lesions, or signs of cervicitis.    Uterus: Normal mobile, non-enlarged uterus    Adnexa: Normal without masses or lesions   Pelvic Floor No POP noted.    Psych Normal affect. Normal mood.      Thank you for coming to your annual exam. Your findings during the exam were normal. Specific topics addressed during this exam included: healthy lifestyle, well woman screening guidelines,     Actions performed during this visit include:  - Clinical breast exam  - Clinical pelvic exam  - pap  - declines STD screen  No orders of the defined types were placed in this encounter.          Please return for your next visit in 1 year.  "

## 2024-08-12 ENCOUNTER — LAB (OUTPATIENT)
Dept: LAB | Facility: LAB | Age: 29
End: 2024-08-12
Payer: COMMERCIAL

## 2024-08-12 DIAGNOSIS — E84.9 CYSTIC FIBROSIS (MULTI): ICD-10-CM

## 2024-08-12 DIAGNOSIS — Z00.00 ROUTINE GENERAL MEDICAL EXAMINATION AT A HEALTH CARE FACILITY: ICD-10-CM

## 2024-08-12 LAB
ALBUMIN SERPL BCP-MCNC: 3.8 G/DL (ref 3.4–5)
ALP SERPL-CCNC: 78 U/L (ref 33–110)
ALT SERPL W P-5'-P-CCNC: 9 U/L (ref 7–45)
ANION GAP SERPL CALC-SCNC: 13 MMOL/L (ref 10–20)
AST SERPL W P-5'-P-CCNC: 10 U/L (ref 9–39)
BASOPHILS # BLD AUTO: 0.07 X10*3/UL (ref 0–0.1)
BASOPHILS NFR BLD AUTO: 0.8 %
BILIRUB SERPL-MCNC: 0.4 MG/DL (ref 0–1.2)
BUN SERPL-MCNC: 13 MG/DL (ref 6–23)
CALCIUM SERPL-MCNC: 9.2 MG/DL (ref 8.6–10.6)
CHLORIDE SERPL-SCNC: 105 MMOL/L (ref 98–107)
CHOLEST SERPL-MCNC: 141 MG/DL (ref 0–199)
CHOLESTEROL/HDL RATIO: 2.9
CO2 SERPL-SCNC: 24 MMOL/L (ref 21–32)
COTININE UR QL SCN: NEGATIVE
CREAT SERPL-MCNC: 0.79 MG/DL (ref 0.5–1.05)
EGFRCR SERPLBLD CKD-EPI 2021: >90 ML/MIN/1.73M*2
EOSINOPHIL # BLD AUTO: 0.67 X10*3/UL (ref 0–0.7)
EOSINOPHIL NFR BLD AUTO: 7.8 %
ERYTHROCYTE [DISTWIDTH] IN BLOOD BY AUTOMATED COUNT: 11.7 % (ref 11.5–14.5)
EST. AVERAGE GLUCOSE BLD GHB EST-MCNC: 108 MG/DL
GLUCOSE SERPL-MCNC: 96 MG/DL (ref 74–99)
HBA1C MFR BLD: 5.4 %
HCT VFR BLD AUTO: 40.1 % (ref 36–46)
HDLC SERPL-MCNC: 48.7 MG/DL
HGB BLD-MCNC: 13.1 G/DL (ref 12–16)
IMM GRANULOCYTES # BLD AUTO: 0.03 X10*3/UL (ref 0–0.7)
IMM GRANULOCYTES NFR BLD AUTO: 0.3 % (ref 0–0.9)
LDLC SERPL CALC-MCNC: 25 MG/DL
LYMPHOCYTES # BLD AUTO: 3.37 X10*3/UL (ref 1.2–4.8)
LYMPHOCYTES NFR BLD AUTO: 39.3 %
MCH RBC QN AUTO: 31 PG (ref 26–34)
MCHC RBC AUTO-ENTMCNC: 32.7 G/DL (ref 32–36)
MCV RBC AUTO: 95 FL (ref 80–100)
MONOCYTES # BLD AUTO: 0.53 X10*3/UL (ref 0.1–1)
MONOCYTES NFR BLD AUTO: 6.2 %
NEUTROPHILS # BLD AUTO: 3.91 X10*3/UL (ref 1.2–7.7)
NEUTROPHILS NFR BLD AUTO: 45.6 %
NON HDL CHOLESTEROL: 92 MG/DL (ref 0–149)
NRBC BLD-RTO: 0 /100 WBCS (ref 0–0)
PLATELET # BLD AUTO: 323 X10*3/UL (ref 150–450)
POTASSIUM SERPL-SCNC: 4.4 MMOL/L (ref 3.5–5.3)
PROT SERPL-MCNC: 7.1 G/DL (ref 6.4–8.2)
RBC # BLD AUTO: 4.22 X10*6/UL (ref 4–5.2)
SODIUM SERPL-SCNC: 138 MMOL/L (ref 136–145)
TRIGL SERPL-MCNC: 337 MG/DL (ref 0–149)
TSH SERPL-ACNC: 2.96 MIU/L (ref 0.44–3.98)
VLDL: 67 MG/DL (ref 0–40)
WBC # BLD AUTO: 8.6 X10*3/UL (ref 4.4–11.3)

## 2024-08-12 PROCEDURE — RXMED WILLOW AMBULATORY MEDICATION CHARGE

## 2024-08-14 ENCOUNTER — PHARMACY VISIT (OUTPATIENT)
Dept: PHARMACY | Facility: CLINIC | Age: 29
End: 2024-08-14
Payer: MEDICAID

## 2024-08-21 ENCOUNTER — SPECIALTY PHARMACY (OUTPATIENT)
Dept: PHARMACY | Facility: CLINIC | Age: 29
End: 2024-08-21

## 2024-08-26 ENCOUNTER — PHARMACY VISIT (OUTPATIENT)
Dept: PHARMACY | Facility: CLINIC | Age: 29
End: 2024-08-26
Payer: COMMERCIAL

## 2024-08-26 PROCEDURE — RXMED WILLOW AMBULATORY MEDICATION CHARGE

## 2024-09-09 PROCEDURE — RXMED WILLOW AMBULATORY MEDICATION CHARGE

## 2024-09-10 ENCOUNTER — PHARMACY VISIT (OUTPATIENT)
Dept: PHARMACY | Facility: CLINIC | Age: 29
End: 2024-09-10
Payer: MEDICAID

## 2024-09-12 ENCOUNTER — TELEPHONE (OUTPATIENT)
Dept: PEDIATRIC PULMONOLOGY | Facility: HOSPITAL | Age: 29
End: 2024-09-12
Payer: COMMERCIAL

## 2024-09-13 ENCOUNTER — TELEPHONE (OUTPATIENT)
Dept: PEDIATRIC PULMONOLOGY | Facility: HOSPITAL | Age: 29
End: 2024-09-13
Payer: COMMERCIAL

## 2024-09-13 NOTE — TELEPHONE ENCOUNTER
Left message for pt to let her know that her PFT test is scheduled in [facility] at 9am on 9/25 since our PFT lab will be closed.

## 2024-09-16 PROCEDURE — RXMED WILLOW AMBULATORY MEDICATION CHARGE

## 2024-09-17 ENCOUNTER — MULTIDISCIPLINARY MEETING (OUTPATIENT)
Dept: PEDIATRIC PULMONOLOGY | Facility: HOSPITAL | Age: 29
End: 2024-09-17
Payer: COMMERCIAL

## 2024-09-17 ENCOUNTER — PHARMACY VISIT (OUTPATIENT)
Dept: PHARMACY | Facility: CLINIC | Age: 29
End: 2024-09-17
Payer: COMMERCIAL

## 2024-09-19 NOTE — PROGRESS NOTES
"    Neftali Walsh M.D. 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 Her last hospitalization was in May of 2018 when she was treated with IV cefepime and tobramycin and PO Bactrim for MRSA and Stenotrophomonas coverage. She recently was treated with inhaled aztreonam and PO Levaquin for PSA irradication successfully.    HPI (9/25/2024):  She reports her breathing is \"good\". Notes she uses albuterol only when feeling more congested and uses Pulmozyme more at this point too. Was wheezing more in July and notes improvement with Dulera doing 1 puff twice a day. She doesn't notice any vocal changes or trush since starting the Dulera.     RECALL:  Previously saw Dr. Juarez in CF clinic. This is my first time meeting Kinjal.  She reports no somatic concerns today. She has no sinus issues, breathing concerns, change in sputum production (usually has increase at night). She feels her reflux is well controlled with omeprazole and her bowel habits have been well regulated with Creon. 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 Denies any disruption in her sleep.    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 [employer]   Lives with her boyfriend of 8 years - no changes to living situation; thinking about having kids in the next few years  [facility] and medical school  From [place], immediate family has since moved to [place]     Current Use of Health Management Strategies:    Respiratory Interventions  Albuterol: Other ; 1-2x/month; sometimes before a cold with relief  Pulmozyme: 1-2x/month  Hypertonic saline: Does not use; prior to Trikafta used 7% bid   HFCWO (percussive vest): Hillrom Vest 1-2 x/month  Oscillatory " "PEP: Does not use  Exercise: No Regular Exercise  LTE antagonist: No  Azithromycin: not any more  Aztreonam lysine ([place]): Does not use  Tobramycin Does not use: Does not use  Colistin: Does not use  Meropenem (inhaled): Does not use  ICS: Does not use  ICS/LABA: Mometasone-formoterol (Dulera HFA)  200mg-5mg- uses daily  LAMA: No  CFTR modulator: Elexacaftor-Tezacaftor-Ivacaftor (Trikafta) Full-dose (2 orange tablets in AM, 1 blue tablet in PM) - Started December 2020  Oxygen: Does not use    Digestive Health Interventions    Acid-suppressing medication?: Yes - AM patoprazole  Using CF vitamins?: Yes  Taking pancreatic enzymes?: Yes Creon 24 2 meals 1 snacks  Any diarrhea?: No  Any steatorrhea?: No  Any constipation?: No  Using laxatives?: No  Feeding tube?: No  Supplemental enteral nutrition?: No    Pulmonary Function Test Results  1/24/24: FEV1/FVC: 70; FEV1=2.97L (92% pred)  5/8/23: FEV1/FVC 70; FEV1=3.03L (94% pred)  4/12/21: FEV1/FVC 72: FEV1 3.16L (96% pred)        Current Medications     Current Outpatient Medications   Medication Instructions    albuterol (ProAir HFA) 90 mcg/actuation inhaler Inhale 2 puffs by mouth every 4 hours if needed for wheezing or shortness of breath (and before airway clearance).    BD Ultra-Fine Bea Pen Needle 32 gauge x 5/32\" needle Use as directed with insulin pens to administer insulin daily    blood sugar diagnostic strip Use as directed to check blood glucose 3 times daily    blood-glucose meter (Accu-Chek Guide Glucose Meter) misc Use as directed to check blood glucoses 3 times a day    blood-glucose sensor (Dexcom G7 Sensor) device Use as directed to continuously monitor glucose. Change every 10 days.    cetirizine (ZYRTEC) 10 mg, oral, Daily    Dulera 200-5 mcg/actuation inhaler Inhale 1 puff by mouth 2 times a day    elexacaftor-tezacaftor-ivacaft (Trikafta) 100-50-75 mg tablet TAKE TWO (2) ORANGE TABLETS BY MOUTH EVERY MORNING AND ONE (1) BLUE TABLET BY MOUTH EVERY " EVENING ABOUT 12 HOURS APART. TAKE WITH FATTY FOODS    insulin glargine (Lantus) 100 unit/mL (3 mL) pen Inject 6 units daily as directed per insulin instructions.    lancets (Accu-Chek Softclix Lancets) misc Use as directed to check blood glucose 3 times a day    lipase-protease-amylase (Creon) 24,000-76,000 -120,000 unit capsule Take 2 capsules by mouth with meals (3 meals/day) and 1 capsule by mouth with snacks (2 snacks/day)    Lorena 0.25-35 mg-mcg tablet 1 tablet, oral, Daily    multivitamin tablet 1 tablet, oral, Daily    nebulizers (Altera Nebulizer Handset) misc 1 each, miscellaneous, 3 times daily, Please dispense one Ana Maria Altera Handset for inhaled [place].    oseltamivir (TAMIFLU) 75 mg, oral, Every 12 hours    pantoprazole (PROTONIX) 40 mg, oral, 2 times daily, Do not crush, chew, or split.       Physical Examination     Vitals:    09/25/24 0942   BP: 132/83   Pulse: 95   Resp: 16   Temp: 37 °C (98.6 °F)   SpO2: 98%       General: ambulated independently; no acute distress; well-nourished; work of breathing was not increased; normal vocal character  HEENT: normocephalic; anicteric sclerae; conjunctivae not injected; nasal mucosa was unremarkable; oropharynx was clear without evidence of thrush; dentition is good  Chest: clear to auscultation bilaterally  Cardiac: regular rhythm; no gallop or murmur.  Abdomen: soft; non-tender; non-distended; no hepatosplenomegaly.  Extremities: no leg edema; +mild digital clubbing; 2+ pulses  Psychiatric: did not appear depressed or anxious.     Metabolic Parameters     Sodium   Date/Time Value Ref Range Status   08/12/2024 08:04  136 - 145 mmol/L Final     Potassium   Date/Time Value Ref Range Status   08/12/2024 08:04 AM 4.4 3.5 - 5.3 mmol/L Final     Chloride   Date/Time Value Ref Range Status   08/12/2024 08:04  98 - 107 mmol/L Final     Bicarbonate   Date/Time Value Ref Range Status   08/12/2024 08:04 AM 24 21 - 32 mmol/L Final     Anion Gap   Date/Time  Value Ref Range Status   08/12/2024 08:04 AM 13 10 - 20 mmol/L Final     Urea Nitrogen   Date/Time Value Ref Range Status   08/12/2024 08:04 AM 13 6 - 23 mg/dL Final     Creatinine   Date/Time Value Ref Range Status   08/12/2024 08:04 AM 0.79 0.50 - 1.05 mg/dL Final     GFR Female   Date/Time Value Ref Range Status   05/08/2023 12:24 PM >90 >90 mL/min/1.73m2 Final     Comment:      CALCULATIONS OF ESTIMATED GFR ARE PERFORMED   USING THE 2021 CKD-EPI STUDY REFIT EQUATION   WITHOUT THE RACE VARIABLE FOR THE IDMS-TRACEABLE   CREATININE METHODS.    https://jasn.asnjournals.org/content/early/2021/09/22/ASN.3241962332       Glucose   Date/Time Value Ref Range Status   08/12/2024 08:04 AM 96 74 - 99 mg/dL Final     Calcium   Date/Time Value Ref Range Status   08/12/2024 08:04 AM 9.2 8.6 - 10.6 mg/dL Final       Hematologic Parameters     WBC   Date/Time Value Ref Range Status   08/12/2024 08:04 AM 8.6 4.4 - 11.3 x10*3/uL Final     Neutrophils Absolute   Date/Time Value Ref Range Status   08/12/2024 08:04 AM 3.91 1.20 - 7.70 x10*3/uL Final     Comment:     Percent differential counts (%) should be interpreted in the context of the absolute cell counts (cells/uL).     Neutrophils %   Date/Time Value Ref Range Status   08/12/2024 08:04 AM 45.6 40.0 - 80.0 % Final     Lymphocytes %   Date/Time Value Ref Range Status   08/12/2024 08:04 AM 39.3 13.0 - 44.0 % Final     Monocytes %   Date/Time Value Ref Range Status   08/12/2024 08:04 AM 6.2 2.0 - 10.0 % Final     Basophils %   Date/Time Value Ref Range Status   08/12/2024 08:04 AM 0.8 0.0 - 2.0 % Final     Eosinophils Absolute   Date/Time Value Ref Range Status   08/12/2024 08:04 AM 0.67 0.00 - 0.70 x10*3/uL Final     Eosinophils %   Date/Time Value Ref Range Status   08/12/2024 08:04 AM 7.8 0.0 - 6.0 % Final     Hemoglobin   Date/Time Value Ref Range Status   08/12/2024 08:04 AM 13.1 12.0 - 16.0 g/dL Final     Hematocrit   Date/Time Value Ref Range Status   08/12/2024 08:04 AM  40.1 36.0 - 46.0 % Final     RBC   Date/Time Value Ref Range Status   08/12/2024 08:04 AM 4.22 4.00 - 5.20 x10*6/uL Final     MCV   Date/Time Value Ref Range Status   08/12/2024 08:04 AM 95 80 - 100 fL Final     MCHC   Date/Time Value Ref Range Status   08/12/2024 08:04 AM 32.7 32.0 - 36.0 g/dL Final     Platelets   Date/Time Value Ref Range Status   08/12/2024 08:04  150 - 450 x10*3/uL Final       Fat-Soluble Vitamin Levels     Vitamin D, 25-Hydroxy, Total   Date/Time Value Ref Range Status   01/24/2024 12:38 PM 48 30 - 100 ng/mL Final     Vitamin A (Retinol)   Date/Time Value Ref Range Status   01/24/2024 12:38 PM 0.91 0.30 - 1.20 mg/L Final     Vitamin A, Interpretation   Date/Time Value Ref Range Status   01/24/2024 12:38 PM Normal  Final     Comment:       This test was developed and its performance characteristics   determined by [facility]. It has not been cleared or   approved by the US Food and Drug Administration. This test was   performed in a CLIA certified laboratory and is intended for   clinical purposes.  Performed By: [facility]  [address] [place] [address]  : Trevor Gilliam MD, PhD  CLIA Number: [id number]     Vitamin E (Alpha-Tocopherol)   Date/Time Value Ref Range Status   01/24/2024 12:38 PM 12.1 5.5 - 18.0 mg/L Final     Comment:       This test was developed and its performance characteristics   determined by [facility]. It has not been cleared or   approved by the US Food and Drug Administration. This test was   performed in a CLIA certified laboratory and is intended for   clinical purposes.     Vitamin E (Gamma-Tocopherol)   Date/Time Value Ref Range Status   01/24/2024 12:38 PM 0.2 0.0 - 6.0 mg/L Final     Comment:     Performed By: [facility]  [address] [place], [place] [id number]  : Trevor Gilliam MD, PhD  CLIA Number: [id number]       LFT and Associated Parameters     AST   Date/Time  Value Ref Range Status   08/12/2024 08:04 AM 10 9 - 39 U/L Final     ALT   Date/Time Value Ref Range Status   08/12/2024 08:04 AM 9 7 - 45 U/L Final     Comment:     Patients treated with Sulfasalazine may generate falsely decreased results for ALT.     Alkaline Phosphatase   Date/Time Value Ref Range Status   08/12/2024 08:04 AM 78 33 - 110 U/L Final     Bilirubin, Total   Date/Time Value Ref Range Status   08/12/2024 08:04 AM 0.4 0.0 - 1.2 mg/dL Final     GGT   Date/Time Value Ref Range Status   01/24/2024 12:38 PM 14 5 - 55 U/L Final     Albumin   Date/Time Value Ref Range Status   08/12/2024 08:04 AM 3.8 3.4 - 5.0 g/dL Final     Total Protein   Date/Time Value Ref Range Status   08/12/2024 08:04 AM 7.1 6.4 - 8.2 g/dL Final       Coagulation Parameters     Protime   Date/Time Value Ref Range Status   05/29/2019 02:05 PM 11.4 9.7 - 12.7 sec Final     INR   Date/Time Value Ref Range Status   05/29/2019 02:05 PM 1.0 0.9 - 1.1 Final       Diabetes Laboratory Tests     Hemoglobin A1C   Date/Time Value Ref Range Status   08/12/2024 08:04 AM 5.4 see below % Final     Albumin/Creatine Ratio   Date/Time Value Ref Range Status   05/08/2023 12:40 PM SEE COMMENT 0.0 - 30.0 ug/mg crt Final     Comment:     One or more analytes used in this calculation   is outside of the analytical measurement range.  Calculation cannot be performed.          Immunology Laboratory Tests     IgE   Date/Time Value Ref Range Status   01/24/2024 12:38 PM 19 0 - 214 IU/mL Final       DEXA Scan     XR DEXA bone density 05/08/2023    Narrative  Interpreted By:  RAMILA MORGAN MD  Name: ADRIÁN VALENZUELA  Patient ID: [id number] YOB: 1995 Height: 64.0 in.  Gender:     Female   Exam Date:  05/08/2023 Weight: 135.0 lbs.  Indications: Cystis Fibrosis, Family Histoy Osteoporosis, Post  Menopausal, Screening  Fractures:    Treatments:  Multivitamin        LEFT FEMUR - TOTAL  The bone mineral density : 0.827 g/cm2  T-score : -1.4    % of young  normal mean :82 %  Z-score : -1.4    % of age matched mean : 82 %  % change vs. Previous : -     % change vs. Baseline : baseline  *Indicates significant change based on 95% confidence interval.    LEFT FEMUR - NECK  The bone mineral density : 0.866 g/cm2  T-score : -1.2    % of young normal mean: 83 %  Z-score : -1.2    % of age matched mean : 84 %  % change vs. Previous : -     % change vs. Baseline : baseline  *Indicates significant change based on 95% confidence interval.    SPINE L1-L4  The bone mineral density is 0.897 g/cm2  T-score : -2.4   % of young normal mean is 76 %  Z-score : -2.4    % of age matched mean is 76 %  % change vs. Previous : -     % change vs. Baseline : baseline  *Indicates significant change based on 95% confidence interval.    Interpretation:  The lowest Z-score measured at AP Spine L1-L4 with a Z-score of -2.4  is below normal limits for their age and sex.    Follow-up DEXA and treatment is recommended as clinically indicated.    All images and detailed analysis are available on the  Radiology  PACS.       Chest Radiograph     XR chest 2 view 05/08/2023    Narrative  Interpreted By:  TISHA RODRIGUEZ MD and MANUEL HERNANDEZ MD  MRN: [id number]  Patient Name: ADRIÁN VALENZUELA    STUDY:  TH CHEST 2 VIEW PA AND LAT;  5/8/2023 2:32 pm    INDICATION:  baseline for CF, malabsorption  Z00.00: Health Maintenance E84.9:  Cystic fibrosis.    COMPARISON:  Chest radiograph, 05/25/2018    ACCESSION NUMBER(S):  [id number]    ORDERING CLINICIAN:  CRISTINA VELASQUEZ    FINDINGS:  PA and lateral radiographs of the chest were provided.  Additional PA  dual energy images were also provided.    CARDIOMEDIASTINAL SILHOUETTE:  Cardiomediastinal silhouette is normal in size and configuration.    LUNGS:  Background changes of cystic fibrosis with cystic changes and mild  peribronchial thickening, not significantly changed compared to prior  study on 05/25/2018. No consolidation, pleural effusion,  or  pneumothorax.    ABDOMEN:  No remarkable upper abdominal findings.    BONES:  No acute osseous changes.    Impression  1.  Background changes of cystic fibrosis, similar to prior study on  05/25/2018.  2. No acute cardiopulmonary process.    I personally reviewed the images/study and I agree with the findings  as stated by resident physician Dr. Dee Dee Freire.       CF Sputum Culture     AFB Culture   Date Value Ref Range Status   09/25/2024   Preliminary    Culture in progress and will be examined weekly. A result will be issued either when positive or after 8 weeks incubation.   03/25/2024 No Mycobacteria isolated.  Final   01/24/2024 No Mycobacteria isolated.  Final      Respiratory Culture, Cystic Fibrosis   Date/Time Value Ref Range Status   09/25/2024 10:56 AM (2+) Few Normal throat ganesh  Final   09/25/2024 10:56 AM (A)  Final    (1+) Rare Methicillin Susceptible Staphylococcus aureus (MSSA)       Susceptibility data from last 90 days.  Collected Specimen Info Organism Clindamycin Erythromycin Oxacillin Tetracycline Trimethoprim/Sulfamethoxazole Vancomycin   09/25/24 Fluid from SPUTUM Methicillin Susceptible Staphylococcus aureus (MSSA)  S  S  S  S  S  S     Normal throat ganesh             Problem List Items Addressed This Visit       Cystic fibrosis    Relevant Medications    lipase-protease-amylase (Creon) 24,000-76,000 -120,000 unit capsule    Dulera 200-5 mcg/actuation inhaler    albuterol (ProAir HFA) 90 mcg/actuation inhaler    Other Relevant Orders    AFB Culture/Smear (Completed)    Fungal Culture/Smear (Completed)    Respiratory Culture, Cystic Fibrosis (Completed)     Other Visit Diagnoses       Healthcare maintenance        Relevant Medications    oseltamivir (Tamiflu) 75 mg capsule    Other Relevant Orders    Flu vaccine, trivalent, preservative free, age 6 months and greater (Fluarix/Fluzone/Flulaval) (Completed)    Bronchiectasis without complication (Multi)        Relevant Orders    AFB  Culture/Smear (Completed)             Jadiel Reyes is a 28 year old F with hx of CF on Trikafta with last exacerbation in 2018 without need for OP antibiotics since that last admission. She has no somatic concerns today.  She has established care with a PCP and reports being up to date with gyn screenings.    > Cystic Fibrosis  (dF508/dF508; FEV1=92%) with chronic MRSA, PSA  - Modulator: Trikafta (Start December 2020)  - Bronchodilators: Dulera, Albuterol  - HTS: does not use  - Dornase: occasional use  - Inhaled ABX: none  - Airway Clearance: none    Health Care Maintenance:  - Spirometry completed: today  - Annual lab:s Due 1/2024   - CF Cultures/Swab today -> Sputum sample provided today  - AFB today if able to produce sputum  - Last CXR - 5/8/23  - DEXA Scan - 5/8/23 - below limits of normal for age; repeat 2026  - Transplant - discussed indications for lung transplant in female patients with CF  - Colon Ca Screening - not yet due  - OGTT - n/a  - Liver ultrasound - ordered ,needs    > Exocrine Pancreatic Insufficiency  - 24Creon 2 capsules with meals and 1 capsule with snacks  - Continue DEKAs supplementation  - Needs fecal elastase - kit provided in clinic    > Weight Decrease in pat - now stable  - continue current diet      Tami Brown MD   09/25/2024

## 2024-09-25 ENCOUNTER — NUTRITION (OUTPATIENT)
Dept: PEDIATRIC PULMONOLOGY | Facility: HOSPITAL | Age: 29
End: 2024-09-25

## 2024-09-25 ENCOUNTER — MULTIDISCIPLINARY VISIT (OUTPATIENT)
Dept: PEDIATRIC PULMONOLOGY | Facility: HOSPITAL | Age: 29
End: 2024-09-25
Payer: COMMERCIAL

## 2024-09-25 ENCOUNTER — HOSPITAL ENCOUNTER (OUTPATIENT)
Dept: RESPIRATORY THERAPY | Facility: HOSPITAL | Age: 29
Discharge: HOME | End: 2024-09-25
Payer: COMMERCIAL

## 2024-09-25 VITALS
HEIGHT: 63 IN | HEART RATE: 95 BPM | SYSTOLIC BLOOD PRESSURE: 132 MMHG | WEIGHT: 130.18 LBS | DIASTOLIC BLOOD PRESSURE: 83 MMHG | TEMPERATURE: 98.6 F | RESPIRATION RATE: 16 BRPM | BODY MASS INDEX: 23.07 KG/M2 | OXYGEN SATURATION: 98 %

## 2024-09-25 DIAGNOSIS — J47.9 BRONCHIECTASIS WITHOUT COMPLICATION (MULTI): ICD-10-CM

## 2024-09-25 DIAGNOSIS — E84.9 CF (CYSTIC FIBROSIS) (MULTI): ICD-10-CM

## 2024-09-25 DIAGNOSIS — Z00.00 HEALTHCARE MAINTENANCE: ICD-10-CM

## 2024-09-25 DIAGNOSIS — E84.9 CYSTIC FIBROSIS: ICD-10-CM

## 2024-09-25 LAB
MGC ASCENT PFT - FEV1 - PRE: 2.89
MGC ASCENT PFT - FEV1 - PREDICTED: 3.03
MGC ASCENT PFT - FVC - PRE: 4.42
MGC ASCENT PFT - FVC - PREDICTED: 3.52

## 2024-09-25 PROCEDURE — 87077 CULTURE AEROBIC IDENTIFY: CPT | Performed by: STUDENT IN AN ORGANIZED HEALTH CARE EDUCATION/TRAINING PROGRAM

## 2024-09-25 PROCEDURE — 90656 IIV3 VACC NO PRSV 0.5 ML IM: CPT | Performed by: STUDENT IN AN ORGANIZED HEALTH CARE EDUCATION/TRAINING PROGRAM

## 2024-09-25 PROCEDURE — 87102 FUNGUS ISOLATION CULTURE: CPT | Performed by: STUDENT IN AN ORGANIZED HEALTH CARE EDUCATION/TRAINING PROGRAM

## 2024-09-25 PROCEDURE — 99214 OFFICE O/P EST MOD 30 MIN: CPT | Performed by: STUDENT IN AN ORGANIZED HEALTH CARE EDUCATION/TRAINING PROGRAM

## 2024-09-25 PROCEDURE — 94010 BREATHING CAPACITY TEST: CPT

## 2024-09-25 PROCEDURE — 87015 SPECIMEN INFECT AGNT CONCNTJ: CPT | Performed by: STUDENT IN AN ORGANIZED HEALTH CARE EDUCATION/TRAINING PROGRAM

## 2024-09-25 RX ORDER — OSELTAMIVIR PHOSPHATE 75 MG/1
75 CAPSULE ORAL EVERY 12 HOURS
Qty: 10 CAPSULE | Refills: 0 | Status: SHIPPED | OUTPATIENT
Start: 2024-09-25 | End: 2024-10-01

## 2024-09-25 RX ORDER — ALBUTEROL SULFATE 90 UG/1
2 INHALANT RESPIRATORY (INHALATION) EVERY 4 HOURS PRN
Qty: 8.5 G | Refills: 11 | Status: SHIPPED | OUTPATIENT
Start: 2024-09-25 | End: 2025-09-25

## 2024-09-25 RX ORDER — MOMETASONE FUROATE AND FORMOTEROL FUMARATE DIHYDRATE 200; 5 UG/1; UG/1
2 AEROSOL RESPIRATORY (INHALATION) 2 TIMES DAILY
Qty: 13 G | Refills: 11 | Status: SHIPPED | OUTPATIENT
Start: 2024-09-25

## 2024-09-25 RX ORDER — PANCRELIPASE 24000; 76000; 120000 [USP'U]/1; [USP'U]/1; [USP'U]/1
CAPSULE, DELAYED RELEASE PELLETS ORAL
Qty: 900 CAPSULE | Refills: 3 | Status: SHIPPED | OUTPATIENT
Start: 2024-09-25

## 2024-09-25 ASSESSMENT — ENCOUNTER SYMPTOMS
LOSS OF SENSATION IN FEET: 0
DEPRESSION: 0
OCCASIONAL FEELINGS OF UNSTEADINESS: 0

## 2024-09-25 NOTE — PROGRESS NOTES
"Cystic Fibrosis Nutrition Consult  Subjective    Resident at [facility]    Nutrition Concerns/Questions:  None    Exercise: Not much    Anthropometrics:  BMI      Estimated body mass index is 23.07 kg/m² as calculated from the following:    Height as of an earlier encounter on 9/25/24: 1.6 m (5' 2.99\").    Weight as of an earlier encounter on 9/25/24: 59.1 kg (130 lb 2.9 oz).     Highest wt? 60.9 kg  Pt goal for wt: Maintainence     Comment:  Fine with current wt.    Seeing GI? No     Liver US: Needs baseline      Additional Diagnosis  CFRD: CFRD  Osteoporosis/penia: Osteopenia  Kidney Stones: No  MAURO: None  Pancreatitis: No  Fracture: None  Food allergies/intolerances: None  Sinus affecting taste or smell: No  Colonoscopy: Not age appropriate  Other/describe: None    Current/Past Feeding History:  Appetite: excellent    Diet Recall: Meals/day: 2 Snacks/Day: 2-3  B: Coffee (cream and sugar)  L: Wraps/Pizza/Tacos  D: Varies  Snack: Cereal; Crackers; Yogurt  Drinks: Coffee, Milk, seltzer water; Alcohol occasionally     Supplements: None  Calcium intake: Milk, yogurt, cheese  Sodium intake: Yes      CF Specific Vitamins: MVW Complete Formulation    Other: None  Acid Blocker: Pantoprazole  Modulators (taking w fat/enzymes?): Trikafta, fat and enzymes  Other PERT medications / Appetite stimulants / insulin / ADHD / CAM: Lantus    Gastrointestinal History:-Willing to recheck fecal elastase  Enzyme: Brand Creon  Dose: w/meal 2     w/snacks: 1         Lipase units/kg/meal: 812.2 Lipase units/kg/day: 2842.6  Enzyme Program: Discussed [employer] has account  Takes enzymes at: Beginning     Adjusting dose for size/fat content? Yes    GI / Absorption (GI symptom tracker date:  9/25/24)  Sx:   No concerns  # stools/d: 1    Description normal, formed, sinks, brown    CFRD/Endocrine  Endo MD and year diagnosed: Cassie, 15 years ago  Last seen: 3/19/24  Blood sugar range: 90s  Lows? Sometimes   Reactive Hypoglycemia? " Yes-Discussed paring carbs with protein and fat      Objective   Labs:           Fat soluble Vits Measured: WNL   Liver Enzymes: WNL  DEXA: Repeat 26  Fasting Glu / HgbA1C: 96/5.4  Other: VLDL (67), Triglyceride (337)    PF Readings from Last 1 Encounters:   No data found for PF       Vitamin D, 25-Hydroxy, Total   Date/Time Value Ref Range Status   01/24/2024 12:38 PM 48 30 - 100 ng/mL Final     Vitamin A (Retinol)   Date/Time Value Ref Range Status   01/24/2024 12:38 PM 0.91 0.30 - 1.20 mg/L Final     Vitamin A, Interpretation   Date/Time Value Ref Range Status   01/24/2024 12:38 PM Normal  Final     Comment:       This test was developed and its performance characteristics   determined by [facility]. It has not been cleared or   approved by the [place] Food and Drug Administration. This test was   performed in a CLIA certified laboratory and is intended for   clinical purposes.  Performed By: [facility]  [address] [place] [address]  : Trevor Gilliam MD, PhD  CLIA Number: [id number]     Vitamin E (Alpha-Tocopherol)   Date/Time Value Ref Range Status   01/24/2024 12:38 PM 12.1 5.5 - 18.0 mg/L Final     Comment:       This test was developed and its performance characteristics   determined by [facility]. It has not been cleared or   approved by the US Food and Drug Administration. This test was   performed in a CLIA certified laboratory and is intended for   clinical purposes.     Vitamin E (Gamma-Tocopherol)   Date/Time Value Ref Range Status   01/24/2024 12:38 PM 0.2 0.0 - 6.0 mg/L Final     Comment:     Performed By: [facility]  [address] [place] [id number]  : Trevor Gilliam MD, PhD  CLIA Number: [id number]         AST   Date/Time Value Ref Range Status   08/12/2024 08:04 AM 10 9 - 39 U/L Final     ALT   Date/Time Value Ref Range Status   08/12/2024 08:04 AM 9 7 - 45 U/L Final     Comment:     Patients treated with  Sulfasalazine may generate falsely decreased results for ALT.     Alkaline Phosphatase   Date/Time Value Ref Range Status   08/12/2024 08:04 AM 78 33 - 110 U/L Final     Bilirubin, Total   Date/Time Value Ref Range Status   08/12/2024 08:04 AM 0.4 0.0 - 1.2 mg/dL Final     GGT   Date/Time Value Ref Range Status   01/24/2024 12:38 PM 14 5 - 55 U/L Final     Albumin   Date/Time Value Ref Range Status   08/12/2024 08:04 AM 3.8 3.4 - 5.0 g/dL Final     Total Protein   Date/Time Value Ref Range Status   08/12/2024 08:04 AM 7.1 6.4 - 8.2 g/dL Final       WBC   Date/Time Value Ref Range Status   08/12/2024 08:04 AM 8.6 4.4 - 11.3 x10*3/uL Final     Neutrophils Absolute   Date/Time Value Ref Range Status   08/12/2024 08:04 AM 3.91 1.20 - 7.70 x10*3/uL Final     Comment:     Percent differential counts (%) should be interpreted in the context of the absolute cell counts (cells/uL).     Neutrophils %   Date/Time Value Ref Range Status   08/12/2024 08:04 AM 45.6 40.0 - 80.0 % Final     Lymphocytes %   Date/Time Value Ref Range Status   08/12/2024 08:04 AM 39.3 13.0 - 44.0 % Final     Monocytes %   Date/Time Value Ref Range Status   08/12/2024 08:04 AM 6.2 2.0 - 10.0 % Final     Basophils %   Date/Time Value Ref Range Status   08/12/2024 08:04 AM 0.8 0.0 - 2.0 % Final     Eosinophils Absolute   Date/Time Value Ref Range Status   08/12/2024 08:04 AM 0.67 0.00 - 0.70 x10*3/uL Final     Eosinophils %   Date/Time Value Ref Range Status   08/12/2024 08:04 AM 7.8 0.0 - 6.0 % Final     Hemoglobin   Date/Time Value Ref Range Status   08/12/2024 08:04 AM 13.1 12.0 - 16.0 g/dL Final     Hematocrit   Date/Time Value Ref Range Status   08/12/2024 08:04 AM 40.1 36.0 - 46.0 % Final     RBC   Date/Time Value Ref Range Status   08/12/2024 08:04 AM 4.22 4.00 - 5.20 x10*6/uL Final     MCV   Date/Time Value Ref Range Status   08/12/2024 08:04 AM 95 80 - 100 fL Final     MCHC   Date/Time Value Ref Range Status   08/12/2024 08:04 AM 32.7 32.0 -  36.0 g/dL Final     Platelets   Date/Time Value Ref Range Status   08/12/2024 08:04  150 - 450 x10*3/uL Final       Sodium   Date/Time Value Ref Range Status   08/12/2024 08:04  136 - 145 mmol/L Final     Potassium   Date/Time Value Ref Range Status   08/12/2024 08:04 AM 4.4 3.5 - 5.3 mmol/L Final     Chloride   Date/Time Value Ref Range Status   08/12/2024 08:04  98 - 107 mmol/L Final     Bicarbonate   Date/Time Value Ref Range Status   08/12/2024 08:04 AM 24 21 - 32 mmol/L Final     Anion Gap   Date/Time Value Ref Range Status   08/12/2024 08:04 AM 13 10 - 20 mmol/L Final     Urea Nitrogen   Date/Time Value Ref Range Status   08/12/2024 08:04 AM 13 6 - 23 mg/dL Final     Creatinine   Date/Time Value Ref Range Status   08/12/2024 08:04 AM 0.79 0.50 - 1.05 mg/dL Final     GFR Female   Date/Time Value Ref Range Status   05/08/2023 12:24 PM >90 >90 mL/min/1.73m2 Final     Comment:      CALCULATIONS OF ESTIMATED GFR ARE PERFORMED   USING THE 2021 CKD-EPI STUDY REFIT EQUATION   WITHOUT THE RACE VARIABLE FOR THE IDMS-TRACEABLE   CREATININE METHODS.    https://jasn.asnjournals.org/content/early/2021/09/22/ASN.[email or web address]       Glucose   Date/Time Value Ref Range Status   08/12/2024 08:04 AM 96 74 - 99 mg/dL Final     Calcium   Date/Time Value Ref Range Status   08/12/2024 08:04 AM 9.2 8.6 - 10.6 mg/dL Final       Protime   Date/Time Value Ref Range Status   05/29/2019 02:05 PM 11.4 9.7 - 12.7 sec Final     INR   Date/Time Value Ref Range Status   05/29/2019 02:05 PM 1.0 0.9 - 1.1 Final       Hemoglobin A1C   Date/Time Value Ref Range Status   08/12/2024 08:04 AM 5.4 see below % Final     Albumin/Creatine Ratio   Date/Time Value Ref Range Status   05/08/2023 12:40 PM SEE COMMENT 0.0 - 30.0 ug/mg crt Final     Comment:     One or more analytes used in this calculation   is outside of the analytical measurement range.  Calculation cannot be performed.         Lab Results   Component Value Date    CHOL 141  "08/12/2024    CHOL 161 05/08/2023    CHOL 122 05/29/2019     Lab Results   Component Value Date    HDL 48.7 08/12/2024    HDL 57.1 05/08/2023    HDL 53.7 05/29/2019     Lab Results   Component Value Date    LDLCALC 25 08/12/2024     Lab Results   Component Value Date    TRIG 337 (H) 08/12/2024    TRIG 302 (H) 05/08/2023    TRIG 101 05/29/2019     No components found for: \"CHOLHDL\"    XR DEXA bone density 05/08/2023    Narrative  Interpreted By:  RAMILA MORGAN MD  Name: ADRIÁN VALENZUELA  Patient ID: [id number] YOB: 1995 Height: 64.0 in.  Gender:     Female   Exam Date:  05/08/2023 Weight: 135.0 lbs.  Indications: Cystis Fibrosis, Family Histoy Osteoporosis, Post  Menopausal, Screening  Fractures:    Treatments:  Multivitamin        LEFT FEMUR - TOTAL  The bone mineral density : 0.827 g/cm2  T-score : -1.4    % of young normal mean :82 %  Z-score : -1.4    % of age matched mean : 82 %  % change vs. Previous : -     % change vs. Baseline : baseline  *Indicates significant change based on 95% confidence interval.    LEFT FEMUR - NECK  The bone mineral density : 0.866 g/cm2  T-score : -1.2    % of young normal mean: 83 %  Z-score : -1.2    % of age matched mean : 84 %  % change vs. Previous : -     % change vs. Baseline : baseline  *Indicates significant change based on 95% confidence interval.    SPINE L1-L4  The bone mineral density is 0.897 g/cm2  T-score : -2.4   % of young normal mean is 76 %  Z-score : -2.4    % of age matched mean is 76 %  % change vs. Previous : -     % change vs. Baseline : baseline  *Indicates significant change based on 95% confidence interval.    Interpretation:  The lowest Z-score measured at AP Spine L1-L4 with a Z-score of -2.4  is below normal limits for their age and sex.    Follow-up DEXA and treatment is recommended as clinically indicated.    All images and detailed analysis are available on the  Radiology  PACS.      Assessment/Plan     Assessment:  Nutritional " Status Category: low    BMI at goal  Vitamins WNL  Pt. Has low vegetable intake  Pt. Experiences reactive hypoglycemia    Adequate weight as evidenced by BMI greater than 22 (Cystic Fibrosis Foundation goal)    Goals:  Maintain wt.  Eat more vegetable    Plan:  Recommend increase physical activity as able  Recommend increase vegetable intake  Recommend pair carbs with protein and fat  Recommend complete liver US  Recommend reach out to RD as nutritional concerns arise

## 2024-09-25 NOTE — PATIENT INSTRUCTIONS
"It was very nice to see you today. We can offer you in-person and \"virtual\" appointments with your cystic fibrosis provider.    If you need to cancel or schedule an appointment, please call the  at the [facility] at [phone] between the hours of 8 AM and 5 PM, Monday-Friday.    To reach the CF nurse, Britt, please call [phone]. Britt has a secure voice mail account if you want to leave a message.  Both [facility] and [facility] are available by [employer] as well!    If you need to speak with an adult cystic fibrosis provider between the hours of 5 PM and 8 AM (overnight) or anytime on Saturday or Sunday, please call [phone]. When you call this number, you will be connected to an  with the [facility] answering service. You should tell the  that you're an adult with cystic fibrosis who needs to speak to the \"cystic fibrosis doctor on-call.\" The  will take your contact information. You should then expect a call back from the cystic fibrosis doctor on-call within a short period of time.      Information on COVID-19 Vaccination:  Vaccines.gov (https://www.vaccines.gov/)  [place] Department of Health (https://gettheshot.coronavirus.[place].gov/)    If you have COVID-19 infection and we decide to treat with anti-viral medications, what pharmacies have medications in-stock?  COVID-19 Therapeutic Distribution  Map (https://covid-19-therapeutics--[facility].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   (goal for adult males with CF = 23.0 kg/m2, goal for adult females with CF = 22.0 kg/m2)    Sick plan (please call our office if you think you need to take antibiotics or be admitted to the hospital):   [] Doxycycline 100 mg by mouth twice daily x 14 " days    We discussed the following:  - Continue Dulera 1 puff twice a day  - Please establish care with a primary care ( Internal medicine or Family Practice - General Scheduling number:  [phone]; this can also be done on-line through the  Website or [employer])

## 2024-09-26 LAB
ACID FAST STN SPEC: NORMAL
MYCOBACTERIUM SPEC CULT: NORMAL

## 2024-09-27 ENCOUNTER — SPECIALTY PHARMACY (OUTPATIENT)
Dept: PHARMACY | Facility: CLINIC | Age: 29
End: 2024-09-27

## 2024-09-27 LAB — FUNGUS SPEC FUNGUS STN: NORMAL

## 2024-09-28 LAB
BACTERIA SPT CF RESP CULT: ABNORMAL
BACTERIA SPT CF RESP CULT: ABNORMAL

## 2024-10-01 LAB — FUNGUS SPEC FUNGUS STN: NORMAL

## 2024-10-02 LAB
ACID FAST STN SPEC: NORMAL
MYCOBACTERIUM SPEC CULT: NORMAL

## 2024-10-07 LAB
FUNGUS SPEC CULT: NORMAL
FUNGUS SPEC FUNGUS STN: NORMAL

## 2024-10-07 PROCEDURE — RXMED WILLOW AMBULATORY MEDICATION CHARGE

## 2024-10-08 ENCOUNTER — PHARMACY VISIT (OUTPATIENT)
Dept: PHARMACY | Facility: CLINIC | Age: 29
End: 2024-10-08
Payer: MEDICAID

## 2024-10-09 LAB
ACID FAST STN SPEC: NORMAL
MYCOBACTERIUM SPEC CULT: NORMAL

## 2024-10-14 LAB
FUNGUS SPEC CULT: NORMAL
FUNGUS SPEC FUNGUS STN: NORMAL

## 2024-10-14 PROCEDURE — RXMED WILLOW AMBULATORY MEDICATION CHARGE

## 2024-10-15 ENCOUNTER — PHARMACY VISIT (OUTPATIENT)
Dept: PHARMACY | Facility: CLINIC | Age: 29
End: 2024-10-15
Payer: COMMERCIAL

## 2024-10-15 PROCEDURE — RXMED WILLOW AMBULATORY MEDICATION CHARGE

## 2024-10-16 ENCOUNTER — PHARMACY VISIT (OUTPATIENT)
Dept: PHARMACY | Facility: CLINIC | Age: 29
End: 2024-10-16
Payer: MEDICAID

## 2024-10-16 LAB
ACID FAST STN SPEC: NORMAL
MYCOBACTERIUM SPEC CULT: NORMAL

## 2024-10-23 LAB
ACID FAST STN SPEC: NORMAL
MYCOBACTERIUM SPEC CULT: NORMAL

## 2024-10-30 LAB
ACID FAST STN SPEC: NORMAL
MYCOBACTERIUM SPEC CULT: NORMAL

## 2024-10-31 PROCEDURE — RXMED WILLOW AMBULATORY MEDICATION CHARGE

## 2024-11-04 ENCOUNTER — SPECIALTY PHARMACY (OUTPATIENT)
Dept: PHARMACY | Facility: CLINIC | Age: 29
End: 2024-11-04

## 2024-11-06 ENCOUNTER — PHARMACY VISIT (OUTPATIENT)
Dept: PHARMACY | Facility: CLINIC | Age: 29
End: 2024-11-06
Payer: COMMERCIAL

## 2024-11-06 LAB
ACID FAST STN SPEC: NORMAL
MYCOBACTERIUM SPEC CULT: NORMAL

## 2024-11-07 DIAGNOSIS — E84.9 CYSTIC FIBROSIS: ICD-10-CM

## 2024-11-07 DIAGNOSIS — E08.9 DIABETES MELLITUS RELATED TO CYSTIC FIBROSIS (MULTI): Primary | ICD-10-CM

## 2024-11-07 DIAGNOSIS — E84.8 EXOCRINE PANCREATIC MANIFESTATION OF CYSTIC FIBROSIS (MULTI): ICD-10-CM

## 2024-11-07 DIAGNOSIS — E84.8 DIABETES MELLITUS RELATED TO CYSTIC FIBROSIS (MULTI): Primary | ICD-10-CM

## 2024-11-13 LAB
ACID FAST STN SPEC: NORMAL
MYCOBACTERIUM SPEC CULT: NORMAL

## 2024-11-19 ENCOUNTER — PHARMACY VISIT (OUTPATIENT)
Dept: PHARMACY | Facility: CLINIC | Age: 29
End: 2024-11-19
Payer: MEDICAID

## 2024-11-19 DIAGNOSIS — Z00.00 HEALTHCARE MAINTENANCE: ICD-10-CM

## 2024-11-19 PROCEDURE — RXMED WILLOW AMBULATORY MEDICATION CHARGE

## 2024-11-20 ENCOUNTER — PHARMACY VISIT (OUTPATIENT)
Dept: PHARMACY | Facility: CLINIC | Age: 29
End: 2024-11-20
Payer: COMMERCIAL

## 2024-11-20 RX ORDER — OSELTAMIVIR PHOSPHATE 75 MG/1
75 CAPSULE ORAL EVERY 12 HOURS
Qty: 10 CAPSULE | Refills: 0 | OUTPATIENT
Start: 2024-11-20 | End: 2024-11-25

## 2024-11-21 ENCOUNTER — PHARMACY VISIT (OUTPATIENT)
Dept: PHARMACY | Facility: CLINIC | Age: 29
End: 2024-11-21
Payer: COMMERCIAL

## 2024-11-21 PROCEDURE — RXMED WILLOW AMBULATORY MEDICATION CHARGE

## 2024-12-11 ENCOUNTER — SPECIALTY PHARMACY (OUTPATIENT)
Dept: PHARMACY | Facility: CLINIC | Age: 29
End: 2024-12-11

## 2024-12-11 PROCEDURE — RXMED WILLOW AMBULATORY MEDICATION CHARGE

## 2024-12-19 ENCOUNTER — PHARMACY VISIT (OUTPATIENT)
Dept: PHARMACY | Facility: CLINIC | Age: 29
End: 2024-12-19
Payer: COMMERCIAL

## 2024-12-20 ENCOUNTER — ALLIED HEALTH (OUTPATIENT)
Dept: GENETICS | Facility: CLINIC | Age: 29
End: 2024-12-20
Payer: COMMERCIAL

## 2024-12-20 VITALS
HEART RATE: 87 BPM | BODY MASS INDEX: 21.34 KG/M2 | HEIGHT: 64 IN | WEIGHT: 125 LBS | SYSTOLIC BLOOD PRESSURE: 131 MMHG | DIASTOLIC BLOOD PRESSURE: 86 MMHG

## 2024-12-20 DIAGNOSIS — E08.9 DIABETES MELLITUS RELATED TO CYSTIC FIBROSIS (MULTI): ICD-10-CM

## 2024-12-20 DIAGNOSIS — E84.9 CYSTIC FIBROSIS: ICD-10-CM

## 2024-12-20 DIAGNOSIS — E84.8 DIABETES MELLITUS RELATED TO CYSTIC FIBROSIS (MULTI): ICD-10-CM

## 2024-12-20 DIAGNOSIS — Z31.430 ENCOUNTER OF FEMALE FOR TESTING FOR GENETIC DISEASE CARRIER STATUS FOR PROCREATIVE MANAGEMENT: ICD-10-CM

## 2024-12-20 DIAGNOSIS — E84.8 EXOCRINE PANCREATIC MANIFESTATION OF CYSTIC FIBROSIS (MULTI): ICD-10-CM

## 2024-12-20 PROCEDURE — 96040 HC GENETIC COUNSELING, EACH 30 MIN: CPT | Performed by: GENETIC COUNSELOR, MS

## 2024-12-20 ASSESSMENT — PAIN SCALES - GENERAL: PAINLEVEL_OUTOF10: 0-NO PAIN

## 2024-12-20 NOTE — PROGRESS NOTES
Thank you for the referral of Kinjal Reyes.  She is a 29 year old, G0, female seen for preconception genetic counseling wit her partner, Luis Yi, due to personal history of cystic fibrosis.         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 She currently is treated with Trikafta and Creon. She and her partner are interested in conception in the next year and would like to pursue CF carrier testing for him.     FAMILY HISTORY  Medical and family histories were reviewed and the following concerns were reported:    Patient   -personal history of CF  -brother is a carrier of CF  -parents had a  loss with Trisomy 21      Patient's reproductive partner  -Age 27, born prematurely  -Twin brother has physical disability associated with cerebral palsy  -another brother had lymphoma at 26    The remainder of the family history was negative for birth defects, intellectual disability, recurrent pregnancy loss, or recognized inherited conditions.  Consanguinity denied.          SELF REPORTED RACE/ETHNICITY:  Patient:    Patient's partner:       COUNSELING:    The following information was discussed with your patient:    1. 1 in 25  individuals are carriers of cystic fibrosis. The clinical features, variable severity and presentation of symptoms, and autosomal recessive nature of this condition were reviewed with the couple. Without any additional testing, the current risk for this couple to have a child affected with cystic fibrosis is 1 in 50 or 2%. Negative carrier screening for the patient's partner would significantly reduce this risk depending on the type of carrier testing performed (mutation panel versus sequencing). Carriers generally do not have symptoms of CF; however some  "studies suggest that the risk of developing pancreatitis may be increased above that of the general population risk (absolute risk still <1%).     2. Carrier testing for cystic fibrosis is available to the patient's reproductive partner. Most platforms involve sequencing of the entire CFTR gene with 98-99% carrier detection rate; although some platforms still include targeted mutation analysis for a set of \"common\" mutations. The benefits, limitations, and cost of carrier screening options were reviewed.     3. In the event that patient's partner is found to be a carrier of cystic fibrosis, there would be a 50% chance to have an affected child; although different mutations can be associated with classic v. Atypical forms of CF. In this case, several reproductive options may be available including: prenatal diagnostic testing, in vitro fertilization with preimplantation genetic testing, utilizing donor egg/sperm/embryo, or adoption.  genetic testing is also available for an at-risk fetus via cord blood.      4. ACOG currently recommends that carrier screening for hemoglobinopathies, thalassemias and spinal muscular atrophy be offered to all pregnant couples, while ACMG recommends that carrier screening for any condition with a carrier frequency higher than 1% be offered.      5. The option of more universal/expanded carrier screening for primarily autosomal recessive and some x-linked conditions also discussed and offered. We discussed the pros and cons of expanded carrier screening including the higher likelihood of being identified as a carrier for at least one condition on a larger panel. Approximately 4% of couples are found to be at risk to have a child with a genetic disorder based on this screening. In rare cases, expanded carrier screening results may have health implications for the tested individual.      6. MFM preconception consultation is recommended for patient.     7. Additional family " history:  -most cases of Trisomy 21 are sporadic and due to nondisjunction; therefore this family history is not likely to increase the patient's risk of having a conception with Trisomy 21 above that of her age-related risk.  -cerebral palsy is typically associated with lack of oxygen during delivery and is seen more frequently in premature and twin pregnancies. Some cases may be associated with a genetic etiology      DISPOSITION: The couple stated that they understood the above information and elected to proceed with expanded carrier screening for both members of the couple; [employer] 267 gene panel (to include carrier testing for CF and 111/113 ACMG recommended conditions) elected and drawn today for both members of the couple.    Katelin Reza MS, [facility] spent 38 minutes with the patient with greater than 50% of the time spent in face to face counseling. Thank you for allowing us to participate in the care of your patient. Should you or your patient have any questions, please do not hesitate to contact our office at [phone].     Sincerely, Katelin Reza M.S. Licensed Genetic Counselor

## 2025-01-02 PROCEDURE — RXMED WILLOW AMBULATORY MEDICATION CHARGE

## 2025-01-09 ENCOUNTER — SPECIALTY PHARMACY (OUTPATIENT)
Dept: PHARMACY | Facility: CLINIC | Age: 30
End: 2025-01-09

## 2025-01-09 PROCEDURE — RXMED WILLOW AMBULATORY MEDICATION CHARGE

## 2025-01-13 ENCOUNTER — PHARMACY VISIT (OUTPATIENT)
Dept: PHARMACY | Facility: CLINIC | Age: 30
End: 2025-01-13
Payer: COMMERCIAL

## 2025-01-13 PROCEDURE — RXMED WILLOW AMBULATORY MEDICATION CHARGE

## 2025-01-19 PROCEDURE — RXMED WILLOW AMBULATORY MEDICATION CHARGE

## 2025-01-20 ENCOUNTER — TELEMEDICINE CLINICAL SUPPORT (OUTPATIENT)
Dept: GENETICS | Facility: CLINIC | Age: 30
End: 2025-01-20
Payer: COMMERCIAL

## 2025-01-20 DIAGNOSIS — Z71.2 ENCOUNTER TO DISCUSS TEST RESULTS: ICD-10-CM

## 2025-01-20 NOTE — PROGRESS NOTES
Kinjal Reyes is a 29 year old, G0, female previously seen for preconception genetic counseling with her partner, Luis Yi, due to personal history of cystic fibrosis. At the time of that appointment, the couple elected to proceed with carrier screening via [employer] 266/267 disease panel and virtual appointment scheduled to discuss their results together. Couple provided verbal consent to discuss and document their results together.            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 She reports that she is homozygous for [id number] mutation and has pulmonary and pancreatic symptoms. She currently is treated with Trikafta and Creon. She and her partner are interested in conception in the next year and initially were interested in pursuing carrier testing for CF for him. The couple elected to proceed with carrier screening via [employer] 266/267 disease panel with results as follows:    Kinjal Reyes:         Luis Yi:         FAMILY HISTORY  Medical and family histories were reviewed and the following concerns were reported:     Patient   -personal history of CF  -brother is a carrier of CF  -parents had a  loss with Trisomy 21      Patient's reproductive partner  -Age 27, born prematurely  -Twin brother has physical disability associated with cerebral palsy  -another brother had lymphoma at 26     The remainder of the family history was negative for birth defects, intellectual disability, recurrent pregnancy loss, or recognized inherited conditions.  Consanguinity denied.              SELF REPORTED RACE/ETHNICITY:  Patient:    Patient's partner:        COUNSELING:     The following information was discussed with your patient:     1.          We reviewed that the couple had expanded carrier screening for 266/267 primarily autosomal recessive conditions. 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 She was NOT identified as a carrier of any other condition. GHASSAN had negative screening for cystic fibrosis, and was identified as a carrier of 3 other conditions (Ataxia Telangiectasis, Biotinidase Deficiency, and Nephrotic syndrome). This couple was NOT identified as being carriers of the same condition, and therefore not identified as a couple at increased risk to have an affected child with one of these diseases. The couple screened negative for all other conditions.  Negative screening significantly reduces, but does not eliminate risk to be a carrier of any one condition. Carriers generally do not have symptoms unless otherwise specified. Other family members are at risk to also be carriers of these conditions.     2. We previously discussed the autosomal recessive nature and clinical symptoms of cystic fibrosis. Kinjal has a diagnosis of CF. As Ghassan screened negative, risk for this couple to have an affected child is estimated to be low (<1 %). Kinjal's offspring are expected to be obligate carriers of CF.     3. Ataxia Telangiectasia is an autosomal recessive condition associated with childhood onset of ataxia, telangiectasias, frequent infections and an increased risk for blood cancers. Ghassan was identified as a carrier of this condition. Carriers are considered to be at increased risk for breast cancer (21%-33% lifetime risk for females, which is approximately double that of the general population risk) and potentially at increased risk of other cancers such as colon, pancreatic, prostate, and ovarian cancer. Current NCCN guidelines do not suggest that screening for these cancers should begin at an earlier age than typically recommended in the absence of family history of these types of cancers. If a family member has had one of these cancers, screening for other family members should begin  10 years younger than the earliest diagnosis in the family. Although individuals who have ataxia telangiectasia are at increased risk for blood cancers, heterozygous carriers are not known to be at increased risk of blood cancer (discussed as Luis's brother has a history of lymphoma). As Kinjal screened negative for mutation in the BRIDGER gene, risk for this couple to have an affected child with ataxia telangiectasia is estimated to be very low (<1%); however each of Luis's offspring are at 50% risk of inheriting the BRIDGER mutation and potentially an increased risk of developing adult onset cancer. For further discussion regarding BRIDGER and its associated risks, as well as coordination of testing for other family members, referral to cancer Genetics may be considered.       4. Biotinidase deficiency is an autosomal recessive condition associated with problems processing biotin and can lead to intellectual disability, seizures, hypotonia, hearing and vision loss and other concerns if untreated. This condition is included in [place]  screening. Luis was identified as a carrier of this condition; however the specific mutation he carriers is associated with partial biotinidase deficiency, which may not require treatment in an affected individual. As Kinjal screened negative, risk for this couple to have an affected child is estimated to be low (<1%).     5. Nephrotic syndrome is an autosomal recessive condition associated with renal failure. Luis was identified as a carrier of this condition. As Kinjal screened negative, risk for this couple to have an affected child is estimated to be low (<1%).      Total time Katelin Reza MS, [facility] spent on day of encounter: 44 minutes (19 minutes with patient, 25 minutes on pre/post patient care activities, including documentation).    Thank you for allowing us to participate in the care of your patient.  Should you or your patient have any questions, please do not  hesitate to contact our office at [phone].      Sincerely,      Katelin Reza MS  Licensed Genetic Counselor

## 2025-01-21 ENCOUNTER — PHARMACY VISIT (OUTPATIENT)
Dept: PHARMACY | Facility: CLINIC | Age: 30
End: 2025-01-21
Payer: COMMERCIAL

## 2025-01-29 ENCOUNTER — TRANSCRIBE ORDERS (OUTPATIENT)
Dept: RESPIRATORY THERAPY | Facility: HOSPITAL | Age: 30
End: 2025-01-29
Payer: COMMERCIAL

## 2025-01-29 DIAGNOSIS — E84.9 CF (CYSTIC FIBROSIS) (MULTI): Primary | ICD-10-CM

## 2025-02-05 PROCEDURE — RXMED WILLOW AMBULATORY MEDICATION CHARGE

## 2025-02-06 ENCOUNTER — SPECIALTY PHARMACY (OUTPATIENT)
Dept: PHARMACY | Facility: CLINIC | Age: 30
End: 2025-02-06

## 2025-02-06 ENCOUNTER — PHARMACY VISIT (OUTPATIENT)
Dept: PHARMACY | Facility: CLINIC | Age: 30
End: 2025-02-06
Payer: COMMERCIAL

## 2025-02-06 PROCEDURE — RXMED WILLOW AMBULATORY MEDICATION CHARGE

## 2025-02-10 ENCOUNTER — PHARMACY VISIT (OUTPATIENT)
Dept: PHARMACY | Facility: CLINIC | Age: 30
End: 2025-02-10
Payer: COMMERCIAL

## 2025-02-13 DIAGNOSIS — E84.9 CYSTIC FIBROSIS: ICD-10-CM

## 2025-02-13 PROCEDURE — RXMED WILLOW AMBULATORY MEDICATION CHARGE

## 2025-02-13 RX ORDER — CETIRIZINE HYDROCHLORIDE 10 MG/1
10 TABLET ORAL DAILY
Qty: 30 TABLET | Refills: 11 | Status: SHIPPED | OUTPATIENT
Start: 2025-02-13 | End: 2026-02-13

## 2025-02-14 ENCOUNTER — PHARMACY VISIT (OUTPATIENT)
Dept: PHARMACY | Facility: CLINIC | Age: 30
End: 2025-02-14
Payer: COMMERCIAL

## 2025-02-14 ENCOUNTER — SPECIALTY PHARMACY (OUTPATIENT)
Dept: PHARMACY | Facility: CLINIC | Age: 30
End: 2025-02-14

## 2025-02-20 PROCEDURE — RXMED WILLOW AMBULATORY MEDICATION CHARGE

## 2025-02-22 ENCOUNTER — PHARMACY VISIT (OUTPATIENT)
Dept: PHARMACY | Facility: CLINIC | Age: 30
End: 2025-02-22
Payer: COMMERCIAL

## 2025-02-24 ENCOUNTER — PHARMACY VISIT (OUTPATIENT)
Dept: PHARMACY | Facility: CLINIC | Age: 30
End: 2025-02-24
Payer: COMMERCIAL

## 2025-03-06 PROCEDURE — RXMED WILLOW AMBULATORY MEDICATION CHARGE

## 2025-03-11 ENCOUNTER — PHARMACY VISIT (OUTPATIENT)
Dept: PHARMACY | Facility: CLINIC | Age: 30
End: 2025-03-11
Payer: COMMERCIAL

## 2025-03-11 ENCOUNTER — SPECIALTY PHARMACY (OUTPATIENT)
Dept: PHARMACY | Facility: CLINIC | Age: 30
End: 2025-03-11

## 2025-03-11 NOTE — PROGRESS NOTES
[facility] SPECIALTY PHARMACY PATIENT REASSESSMENT NOTE:  CYSTIC FIBROSIS    Introduction  Kinjal Reyes is a 29 y.o. female who is on the specialty pharmacy service for management of: Cystic Fibrosis Core    [facility] Supplied Medication::  Trikafta    Duration of therapy: Maintenance    The most recent encounter visit with the referring prescriber   on 9/25/24 was reviewed.   Pharmacy will continue to collaborate in the care of this patient with the referring prescriber Tami Brown MD.    Discussion  Kinjal was contacted on 3/11/2025 at 11:50 AM for a pharmacy visit with encounter number [phone] from:   [facility] SPECIALTY PHARMACY  [address] [address]  Dept: [phone]  Dept Fax: [phone]  Kinjal consented to a/an Telephone visit, which was performed.    Efficacy  Have you developed any new symptoms of your condition? No changes reported  Patient/caregiver feels medication is affecting the disease state: Stable on Trikafta, doing well on Trikafta    Goals  Provided education on goals and possible outcomes of therapy:  Adherence with therapy  Timely completion of appropriate labs  Timely and appropriate follow up with provider  Identify and address medication interactions with presciption medications, OTC medications and supplements  Optimize or maintain quality of life  Cystic Fibrosis: Improve/Maintain lung function  Reduce frequency of illness  Patient has documented target(s) for goals of therapy: Yes    Tolerance  Patient has experienced side effects from this medication: No - no side effects  Changes to current therapy regimen: No    The follow-up timeline was discussed. Every person responds to and reacts to therapy differently. Patient should be assessed for efficacy and tolerability in approximately: other - CF Pharmacist visit at least annually    Adherence      Have you had any unplanned missed doses?No  If yes, how often do you miss doses and  "is there a particular contributing reason?     What barriers to adherence does the patient have? (Answer Y/N for all):  Patient has a difficult time remembering to take medications? No  Difficult administration technique?No  Medication cost? No - mostly no co-pays   Patient does not think medication is beneficial?No  Other?   What actions were taken to address barriers?    Does the patient have any barriers to self-administration (including physical and mental)? No  List barriers:   Describe actions taken to mitigate barriers:    The importance of adherence was discussed and patient/caregiver was advised to take the medication as prescribed by their provider. Encouraged patient/caregiver to call physician's office or specialty pharmacy if they have a question regarding a missed dose.    General Assessment  Changes to home medications, OTCs or supplements: No  Current Outpatient Medications on File Prior to Visit   Medication Sig Dispense Refill    albuterol (ProAir HFA) 90 mcg/actuation inhaler Inhale 2 puffs by mouth every 4 hours if needed for wheezing or shortness of breath (and before airway clearance). 8.5 g 11    BD Ultra-Fine Bea Pen Needle 32 gauge x 5/32\" needle Use as directed with insulin pens to administer insulin daily 50 each 11    blood sugar diagnostic strip Use as directed to check blood glucose 3 times daily 100 strip 11    blood-glucose meter (Accu-Chek Guide Glucose Meter) misc Use as directed to check blood glucoses 3 times a day 1 each 0    blood-glucose sensor (Dexcom G7 Sensor) device Use as directed to continuously monitor glucose. Change every 10 days. 3 each 11    cetirizine (ZyrTEC) 10 mg tablet Take 1 tablet (10 mg) by mouth once daily. 30 tablet 11    Dulera 200-5 mcg/actuation inhaler Inhale 1 puff by mouth 2 times a day 13 g 11    elexacaftor-tezacaftor-ivacaft (Trikafta) 100-50-75 mg tablet TAKE TWO (2) ORANGE TABLETS BY MOUTH EVERY MORNING AND ONE (1) BLUE TABLET BY MOUTH EVERY " EVENING ABOUT 12 HOURS APART. TAKE WITH FATTY FOODS 84 each 11    insulin glargine (Lantus) 100 unit/mL (3 mL) pen Inject 6 units daily as directed per insulin instructions. 15 mL 12    lancets (Accu-Chek Softclix Lancets) misc Use as directed to check blood glucose 3 times a day 100 each 11    lipase-protease-amylase (Creon) 24,000-76,000 -120,000 unit capsule Take 2 capsules by mouth with meals (3 meals/day) and 1 capsule by mouth with snacks (2 snacks/day) 900 capsule 3    Lorena 0.25-35 mg-mcg tablet Take 1 tablet by mouth once daily. 84 tablet 3    multivitamin tablet Take 1 tablet by mouth once daily.      nebulizers (Altera Nebulizer Handset) misc 1 each 3 times a day. Please dispense one Ana Maria Altera Handset for inhaled [place]. 1 each 0    pantoprazole (ProtoNix) 40 mg EC tablet Take 1 tablet (40 mg) by mouth 2 times a day. Do not crush, chew, or split. 60 tablet 11     No current facility-administered medications on file prior to visit.       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)    Asthma    Constipation    MRSA (methicillin resistant Staphylococcus aureus)    Pseudomonas aeruginosa infection    Vitamin D deficiency    Cystic fibrosis       CFTR Genotype: [id number] and [id number]  Current CFTR Modulator: Trikafta  Dose: 2 orange tablets in the morning and 1 blue tablet in the evening with fat containing food  Vertex GPS: Yes    Pulmonary Function Tests     FEV1   Date/Time Value Ref Range Status   01/24/2024 09:15 AM 2.97 liters Final     Comment:     92%       Labs  Lab Results   Component Value Date    WBC 8.6 08/12/2024    HGB 13.1 08/12/2024    HCT 40.1 08/12/2024     08/12/2024    CHOL 141 08/12/2024     TRIG 337 (H) 08/12/2024    HDL 48.7 08/12/2024    ALT 9 08/12/2024    AST 10 08/12/2024     08/12/2024    K 4.4 08/12/2024     08/12/2024    CREATININE 0.79 08/12/2024    BUN 13 08/12/2024    CO2 24 08/12/2024    TSH 2.96 08/12/2024    INR 1.0 05/29/2019    HGBA1C 5.4 08/12/2024     Is laboratory follow-up needed? Liver function tests due annually. Last done: Aug 2024    Advised to contact the pharmacy if there are any changes to the patient's medication list, including prescriptions, OTC medications, herbal products, or supplements.    PATIENT MANAGEMENT:    Do you have any questions regarding your medications, or care? no additional questions    Do you have any concerns with access to your medications? No concerns expressed    How would you classify your Quality of Life? Doing well 9    Fills medications at:  Specialty    How would you describe your satisfaction with  Specialty Pharmacy? Satisfied 9    Do you have any additional suggestions to improve  Specialty Pharmacy services: n/a    IMPRESSION/PLAN:  This patient has not been identified as high risk due to Lack of high risk qualifiers.  The following action was taken: N/A.    Is a clinical intervention needed? n/a  Continue therapies      Additional comments:  -wait on Alyftrek (discussed)       [facility] Patients: Contact information ([phone]) for [facility] and reviewed dispensing process, refill timeline and patient management follow up.  and Communication with the CF team through MyChart or by phone was encouraged. Confirmed understanding of education conducted during assessment. All questions and concerns were addressed and patient/caregiver was encouraged to reach out for additional questions or concerns.    Based on the patient's diagnosis, medication list, progress towards goals, adherence, tolerance, and medication list, medication remains appropriate: Therapy remains appropriate (I attest)    Michelle VAZQUEZ  Charlie PharmD  3/11/2025 11:50 AM

## 2025-03-12 ENCOUNTER — OFFICE VISIT (OUTPATIENT)
Dept: PEDIATRIC ENDOCRINOLOGY | Facility: HOSPITAL | Age: 30
End: 2025-03-12
Payer: COMMERCIAL

## 2025-03-12 ENCOUNTER — HOSPITAL ENCOUNTER (OUTPATIENT)
Dept: RESPIRATORY THERAPY | Facility: HOSPITAL | Age: 30
End: 2025-03-12
Payer: COMMERCIAL

## 2025-03-12 ENCOUNTER — TRANSCRIBE ORDERS (OUTPATIENT)
Dept: PEDIATRIC PULMONOLOGY | Facility: HOSPITAL | Age: 30
End: 2025-03-12
Payer: COMMERCIAL

## 2025-03-12 VITALS
DIASTOLIC BLOOD PRESSURE: 85 MMHG | SYSTOLIC BLOOD PRESSURE: 129 MMHG | RESPIRATION RATE: 16 BRPM | BODY MASS INDEX: 20.72 KG/M2 | HEART RATE: 92 BPM | HEIGHT: 64 IN | OXYGEN SATURATION: 99 % | TEMPERATURE: 98.7 F | WEIGHT: 121.36 LBS

## 2025-03-12 DIAGNOSIS — E84.9 CYSTIC FIBROSIS: Primary | ICD-10-CM

## 2025-03-12 DIAGNOSIS — E08.9 DIABETES MELLITUS RELATED TO CYSTIC FIBROSIS (MULTI): Primary | ICD-10-CM

## 2025-03-12 DIAGNOSIS — E84.8 DIABETES MELLITUS RELATED TO CYSTIC FIBROSIS (MULTI): Primary | ICD-10-CM

## 2025-03-12 DIAGNOSIS — E84.8 EXOCRINE PANCREATIC MANIFESTATION OF CYSTIC FIBROSIS (MULTI): ICD-10-CM

## 2025-03-12 LAB — POC HEMOGLOBIN A1C: 5 % (ref 4.2–6.5)

## 2025-03-12 PROCEDURE — 83036 HEMOGLOBIN GLYCOSYLATED A1C: CPT | Performed by: PEDIATRICS

## 2025-03-12 PROCEDURE — 3079F DIAST BP 80-89 MM HG: CPT | Performed by: PEDIATRICS

## 2025-03-12 PROCEDURE — 3008F BODY MASS INDEX DOCD: CPT | Performed by: PEDIATRICS

## 2025-03-12 PROCEDURE — 3074F SYST BP LT 130 MM HG: CPT | Performed by: PEDIATRICS

## 2025-03-12 PROCEDURE — 95251 CONT GLUC MNTR ANALYSIS I&R: CPT | Performed by: PEDIATRICS

## 2025-03-12 PROCEDURE — 99214 OFFICE O/P EST MOD 30 MIN: CPT | Performed by: PEDIATRICS

## 2025-03-12 PROCEDURE — 99214 OFFICE O/P EST MOD 30 MIN: CPT | Mod: 25 | Performed by: PEDIATRICS

## 2025-03-12 RX ORDER — INSULIN LISPRO 100 [IU]/ML
INJECTION, SOLUTION SUBCUTANEOUS
Qty: 15 ML | Refills: 3 | Status: SHIPPED | OUTPATIENT
Start: 2025-03-12

## 2025-03-12 RX ORDER — PEN NEEDLE, DIABETIC 31 GX5/16"
NEEDLE, DISPOSABLE MISCELLANEOUS
Qty: 150 EACH | Refills: 11 | Status: SHIPPED | OUTPATIENT
Start: 2025-03-12

## 2025-03-12 RX ORDER — GLUCAGON 3 MG/1
1 POWDER NASAL AS NEEDED
Qty: 1 EACH | Refills: 1 | Status: SHIPPED | OUTPATIENT
Start: 2025-03-12

## 2025-03-12 NOTE — PROGRESS NOTES
Virtual or Telephone Consent    An interactive audio and video telecommunication system which permits real time communications between the patient (at the originating site) and provider (at the distant site) was utilized to provide this telehealth service.   Verbal consent was requested and obtained from Kinjal Reyes on this date 3/19/24 for a telehealth visit.     Subjective    Kinjal Reyes is a 29 y.o. year old with a history of Cystic Fibrosis (Delta F508 homozygote), exocrine pancreatic insufficiency, presenting for follow up of Cystic Fibrosis-Related diabetes (CFRD).   CFRD was diagnosed in May 2009  A1C at last visit was   Lab Results   Component Value Date    HGBA1C 5.4 08/12/2024        HPI   INTERVAL HISTORY  Last seen March 2024. Has been doing well since then. Continues on Levemir every morning. Checks some BG.    BG trends:   The patient is currently checking the blood glucose.  Patient is using: glucometer  BG numbers in the 90s.  Someties checks after a meal--sometimes 120s, lot of time in the 80s  Highest is 150s.   Checking about 2 hours after eating    Diabetes is treated with:  Insulin Instructions  Insulin Instructions  Fixed Dose Injections   insulin glargine 100 unit/mL (3 mL) pen (Lantus)   Last edited by Senia Matthews MD on 3/12/2025 at 1:31 PM      Time of Day Dose (units)   6 PM 10         Hyperglycemia:  Symptoms: no polyuria, polydipsia, nocturia    Hypoglycemia:   Hypoglycemia frequency: ~3x/week  Hypoglycemia awareness: Yes   Symptoms of Hypoglycemia: jitteriness and weak or shaky  Timing of Hypoglycemia: After meals---simple carbs--cereal, crackers  Treats hypoglycemia with: Glucose Tablets and Other:  food in lunch  Glucagon prescription:  No, will Rx Baqsimi    Nutrition and Pulmonary review:  Diet Hx:   Weight: unchanged  Pulmonary status: Stable    SCREENING FOR COMPLICATIONS:   Urine albumin:  normal May 2023  Eye doctor: fundoscopic exam normal--Dec 2024    Family  "History   Problem Relation Name Age of Onset    Hyperlipidemia Mother      Other (Fibromuscular dysplasia) Mother      Hypertension Father      Other (Cardiac Death) Maternal Grandfather      Stroke Paternal Grandfather        The patient does not have a known family history of diabetes.    Mom     Social History     Tobacco Use    Smoking status: Never    Smokeless tobacco: Never   Substance Use Topics    Alcohol use: Yes     Comment: Rarely    Drug use: Not Currently        Objective   PHYSICAL EXAM:  /85 (BP Location: Right arm, Patient Position: Sitting, BP Cuff Size: Small adult)   Pulse 92   Temp 37.1 °C (98.7 °F) (Oral)   Resp 16   Ht 1.616 m (5' 3.62\")   Wt 55 kg (121 lb 5.8 oz)   SpO2 99%   BMI 21.08 kg/m²    General: Well nourished, no acute distress  HEENT: NCAT, MMM, eye movements grossly intact  Neck: Supple  Pulm: Non labored breathing  Skin: No visible rash  MSK: normal ROM  Neuro: CN grossly intact  Psych: alert, normal mood     LABS:  Hemoglobin A1C   Date/Time Value Ref Range Status   08/12/2024 08:04 AM 5.4 see below % Final   01/24/2024 12:38 PM 5.2 see below % Final   05/08/2023 12:24 PM 5.2 % Final     Comment:          Diagnosis of Diabetes-Adults   Non-Diabetic: < or = 5.6%   Increased risk for developing diabetes: 5.7-6.4%   Diagnostic of diabetes: > or = 6.5%  .       Monitoring of Diabetes                Age (y)     Therapeutic Goal (%)   Adults:          >18           <7.0   Pediatrics:    13-18           <7.5                   7-12           <8.0                   0- 6            7.5-8.5   American Diabetes Association. Diabetes Care 33(S1), Jan 2010.     09/17/2022 09:02 AM 5.1 4.7 - 5.6 % Final   12/23/2020 10:58 AM 5.7 (H) 4.7 - 5.6 % Final   05/14/2020 03:08 PM 5.0 4.7 - 5.6 % Final     Albumin/Creatine Ratio   Date/Time Value Ref Range Status   05/08/2023 12:40 PM SEE COMMENT 0.0 - 30.0 ug/mg crt Final     Comment:     One or more analytes used in this calculation   is " outside of the analytical measurement range.  Calculation cannot be performed.       Cholesterol   Date/Time Value Ref Range Status   08/12/2024 08:04  0 - 199 mg/dL Final     Comment:           Age      Desirable   Borderline High   High     0-19 Y     0 - 169       170 - 199     >/= 200    20-24 Y     0 - 189       190 - 224     >/= 225         >24 Y     0 - 199       200 - 239     >/= 240   **All ranges are based on fasting samples. Specific   therapeutic targets will vary based on patient-specific   cardiac risk.    Pediatric guidelines reference:Pediatrics 2011, 128(S5).Adult guidelines reference: NCEP ATPIII Guidelines,TOMA 2001, 258:2486-97    Venipuncture immediately after or during the administration of Metamizole may lead to falsely low results. Testing should be performed immediately prior to Metamizole dosing.     LDL   Date/Time Value Ref Range Status   05/08/2023 12:24 PM 44 0 - 99 mg/dL Final     Comment:     .                           NEAR      BORD      AGE      DESIRABLE  OPTIMAL    HIGH     HIGH     VERY HIGH     0-19 Y     0 - 109     ---    110-129   >/= 130     ----    20-24 Y     0 - 119     ---    120-159   >/= 160     ----      >24 Y     0 -  99   100-129  130-159   160-189     >/=190  .       HDL-Cholesterol   Date/Time Value Ref Range Status   08/12/2024 08:04 AM 48.7 mg/dL Final     Comment:       Age       Very Low   Low     Normal    High    0-19 Y    < 35      < 40     40-45     ----  20-24 Y    ----     < 40      >45      ----        >24 Y      ----     < 40     40-60      >60       Triglycerides   Date/Time Value Ref Range Status   08/12/2024 08:04  (H) 0 - 149 mg/dL Final     Comment:        Age         Desirable   Borderline High   High     Very High   0 D-90 D    19 - 174         ----         ----        ----  91 D- 9 Y     0 -  74        75 -  99     >/= 100      ----    10-19 Y     0 -  89        90 - 129     >/= 130      ----    20-24 Y     0 - 114       115 - 149      >/= 150      ----         >24 Y     0 - 149       150 - 199    200- 499    >/= 500    Venipuncture immediately after or during the administration of Metamizole may lead to falsely low results. Testing should be performed immediately prior to Metamizole dosing.     FEV1   Date/Time Value Ref Range Status   01/24/2024 09:15 AM 2.97 liters Final     Comment:     92%        GLUCOSE DATA:  Reported above.      ASSESSMENT/PLAN:   29 yo female with CF, pancreatic insufficiency, CFRD with. Most recent A1c 5%. Reported blood glucoses in target range.  Levemir is being discontinued so we will switch to glargine.   Screening labs UTD. Sees ophtho annually.      Recommend:  --start mealtime insulin ICR 1:30 grams carb and ISF 1:100>150  --change to glargine 9 units daily. 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      Mealtime Carb Ratio (g/unit) Sensitivity Factor (mg/dL/unit) BG Target (mg/dL)   meals 30 100 150

## 2025-03-12 NOTE — PATIENT INSTRUCTIONS
Great to see you!   Your A1c today was 5%  Your Insulin Instruction/Doses are attached    Recommendations:  --start humalog insulin, 1 unit per 30 grams of carb (1/2 unit per 15 grams). Add up all of your carbs and divide by 30. Then round to the nearest 1/2 unit.   --If your blood sugar is high 3 hours after eating, you can take extra insulin. Take 1/2 unit for every 50 you are over 150.   --decrease Lantus to 9 units, can decrease further if you are having lows.       --yearly labs to check for diabetic kidney disease (urine albumin)  --yearly eye exam to check for diabetic retinopathy (damage to the blood vessels in the back of the eye). Please tell your eye doctor that you have diabetes.   --Check your Glucagon to make sure it is not . (Brand names: Baqsimi, G-voke, Glucagen)    Follow up in 3 months with CF Endocrinology  In-person (coordinate with Pulm appointment), In-person (CF endo appointment only), or Virtual    CF Endocrine Clinic:   and  Mornings (pediatric)   and  Afternoons (adult)    Please contact us with any concerns or if you notice:  --low glucoses under 70 and not coming up despite 2 treatments  --high glucoses over 300 for more than 4 hours  --average weekly glucose over 250  --if you need any prescriptions    CF Related Diabetes Team Contact Information:  Phone: [phone]  My Chart message our office  Email: [email or web address]; [email or web address]  Answering Service: [phone] (evenings and weekends)  Fax: [phone]    [facility] Physician: Senia Matthews MD  [facility] Nurses: Danna Daley RN, [facility] and Amada Umana, RN, [facility] Medical Assistant: Shahab Bryan

## 2025-03-13 ENCOUNTER — SPECIALTY PHARMACY (OUTPATIENT)
Dept: PHARMACY | Facility: CLINIC | Age: 30
End: 2025-03-13

## 2025-03-13 PROCEDURE — RXMED WILLOW AMBULATORY MEDICATION CHARGE

## 2025-03-17 ENCOUNTER — PHARMACY VISIT (OUTPATIENT)
Dept: PHARMACY | Facility: CLINIC | Age: 30
End: 2025-03-17
Payer: COMMERCIAL

## 2025-03-17 PROCEDURE — RXMED WILLOW AMBULATORY MEDICATION CHARGE

## 2025-03-18 ENCOUNTER — PHARMACY VISIT (OUTPATIENT)
Dept: PHARMACY | Facility: CLINIC | Age: 30
End: 2025-03-18
Payer: COMMERCIAL

## 2025-03-19 PROCEDURE — RXMED WILLOW AMBULATORY MEDICATION CHARGE

## 2025-03-21 ENCOUNTER — PHARMACY VISIT (OUTPATIENT)
Dept: PHARMACY | Facility: CLINIC | Age: 30
End: 2025-03-21
Payer: COMMERCIAL

## 2025-03-26 ENCOUNTER — NUTRITION (OUTPATIENT)
Dept: PEDIATRIC PULMONOLOGY | Facility: HOSPITAL | Age: 30
End: 2025-03-26

## 2025-03-26 ENCOUNTER — DOCUMENTATION (OUTPATIENT)
Dept: PEDIATRIC PULMONOLOGY | Facility: HOSPITAL | Age: 30
End: 2025-03-26

## 2025-03-26 ENCOUNTER — SOCIAL WORK (OUTPATIENT)
Dept: PEDIATRIC PULMONOLOGY | Facility: HOSPITAL | Age: 30
End: 2025-03-26

## 2025-03-26 ENCOUNTER — MULTIDISCIPLINARY VISIT (OUTPATIENT)
Dept: PEDIATRIC PULMONOLOGY | Facility: HOSPITAL | Age: 30
End: 2025-03-26
Payer: COMMERCIAL

## 2025-03-26 ENCOUNTER — HOSPITAL ENCOUNTER (OUTPATIENT)
Dept: RESPIRATORY THERAPY | Facility: HOSPITAL | Age: 30
Discharge: HOME | End: 2025-03-26
Payer: COMMERCIAL

## 2025-03-26 VITALS
SYSTOLIC BLOOD PRESSURE: 120 MMHG | RESPIRATION RATE: 18 BRPM | TEMPERATURE: 97.9 F | BODY MASS INDEX: 21.25 KG/M2 | OXYGEN SATURATION: 98 % | WEIGHT: 124.45 LBS | HEIGHT: 64 IN | DIASTOLIC BLOOD PRESSURE: 76 MMHG | HEART RATE: 99 BPM

## 2025-03-26 DIAGNOSIS — E84.9 CF (CYSTIC FIBROSIS) (MULTI): ICD-10-CM

## 2025-03-26 DIAGNOSIS — E55.9 VITAMIN D DEFICIENCY: ICD-10-CM

## 2025-03-26 DIAGNOSIS — E84.9 CYSTIC FIBROSIS: Primary | ICD-10-CM

## 2025-03-26 DIAGNOSIS — E08.9 DIABETES MELLITUS RELATED TO CYSTIC FIBROSIS (MULTI): ICD-10-CM

## 2025-03-26 DIAGNOSIS — E84.8 DIABETES MELLITUS RELATED TO CYSTIC FIBROSIS (MULTI): ICD-10-CM

## 2025-03-26 LAB
HOLD SPECIMEN: NORMAL
MGC ASCENT PFT - FEV1 - PRE: 2.89
MGC ASCENT PFT - FEV1 - PREDICTED: 3.02
MGC ASCENT PFT - FVC - PRE: 4.43
MGC ASCENT PFT - FVC - PREDICTED: 3.51

## 2025-03-26 PROCEDURE — 87206 SMEAR FLUORESCENT/ACID STAI: CPT | Performed by: STUDENT IN AN ORGANIZED HEALTH CARE EDUCATION/TRAINING PROGRAM

## 2025-03-26 PROCEDURE — 94010 BREATHING CAPACITY TEST: CPT

## 2025-03-26 PROCEDURE — 87186 SC STD MICRODIL/AGAR DIL: CPT | Performed by: STUDENT IN AN ORGANIZED HEALTH CARE EDUCATION/TRAINING PROGRAM

## 2025-03-26 PROCEDURE — 87102 FUNGUS ISOLATION CULTURE: CPT | Performed by: STUDENT IN AN ORGANIZED HEALTH CARE EDUCATION/TRAINING PROGRAM

## 2025-03-26 PROCEDURE — 99214 OFFICE O/P EST MOD 30 MIN: CPT | Performed by: STUDENT IN AN ORGANIZED HEALTH CARE EDUCATION/TRAINING PROGRAM

## 2025-03-26 PROCEDURE — 94010 BREATHING CAPACITY TEST: CPT | Performed by: STUDENT IN AN ORGANIZED HEALTH CARE EDUCATION/TRAINING PROGRAM

## 2025-03-26 RX ORDER — BUDESONIDE AND FORMOTEROL FUMARATE DIHYDRATE 160; 4.5 UG/1; UG/1
2 AEROSOL RESPIRATORY (INHALATION) 2 TIMES DAILY PRN
Qty: 10.2 G | Refills: 5 | Status: SHIPPED | OUTPATIENT
Start: 2025-03-26 | End: 2025-09-22

## 2025-03-26 SDOH — ECONOMIC STABILITY: FOOD INSECURITY: WITHIN THE PAST 12 MONTHS, THE FOOD YOU BOUGHT JUST DIDN'T LAST AND YOU DIDN'T HAVE MONEY TO GET MORE.: NEVER TRUE

## 2025-03-26 SDOH — ECONOMIC STABILITY: FOOD INSECURITY: WITHIN THE PAST 12 MONTHS, YOU WORRIED THAT YOUR FOOD WOULD RUN OUT BEFORE YOU GOT MONEY TO BUY MORE.: NEVER TRUE

## 2025-03-26 ASSESSMENT — ANXIETY QUESTIONNAIRES
1. FEELING NERVOUS, ANXIOUS, OR ON EDGE: SEVERAL DAYS
5. BEING SO RESTLESS THAT IT IS HARD TO SIT STILL: NOT AT ALL
2. NOT BEING ABLE TO STOP OR CONTROL WORRYING: NOT AT ALL
GAD7 TOTAL SCORE: 1
7. FEELING AFRAID AS IF SOMETHING AWFUL MIGHT HAPPEN: NOT AT ALL
3. WORRYING TOO MUCH ABOUT DIFFERENT THINGS: NOT AT ALL
4. TROUBLE RELAXING: NOT AT ALL
6. BECOMING EASILY ANNOYED OR IRRITABLE: NOT AT ALL

## 2025-03-26 ASSESSMENT — PATIENT HEALTH QUESTIONNAIRE - PHQ9
8. MOVING OR SPEAKING SO SLOWLY THAT OTHER PEOPLE COULD HAVE NOTICED. OR THE OPPOSITE, BEING SO FIGETY OR RESTLESS THAT YOU HAVE BEEN MOVING AROUND A LOT MORE THAN USUAL: NOT AT ALL
6. FEELING BAD ABOUT YOURSELF - OR THAT YOU ARE A FAILURE OR HAVE LET YOURSELF OR YOUR FAMILY DOWN: NOT AT ALL
1. LITTLE INTEREST OR PLEASURE IN DOING THINGS: NOT AT ALL
7. TROUBLE CONCENTRATING ON THINGS, SUCH AS READING THE NEWSPAPER OR WATCHING TELEVISION: NOT AT ALL
4. FEELING TIRED OR HAVING LITTLE ENERGY: NOT AT ALL
3. TROUBLE FALLING OR STAYING ASLEEP OR SLEEPING TOO MUCH: NOT AT ALL
9. THOUGHTS THAT YOU WOULD BE BETTER OFF DEAD, OR OF HURTING YOURSELF: NOT AT ALL
5. POOR APPETITE OR OVEREATING: NOT AT ALL
2. FEELING DOWN, DEPRESSED OR HOPELESS: NOT AT ALL

## 2025-03-26 ASSESSMENT — ENCOUNTER SYMPTOMS
DEPRESSION: 0
OCCASIONAL FEELINGS OF UNSTEADINESS: 0
LOSS OF SENSATION IN FEET: 0

## 2025-03-26 NOTE — PROGRESS NOTES
Subjective   Patient ID: Kinjal Reyes is a 29 y.o. female who presents for Cf outpatient appt.    Social Work Visit Type: This is a Follow-Up visit for Kinjal Reyes  Location: [facility]    Identifying Information                              : 1995  Assessment date: 3/26/2025    Objective     Patient accompanied by:  self    Family System / Parents:    Raised by:  with biological parent(s)  Mother:  Name:  Masha  Education: Highest Level of Education: College Graduate  Father:  Name:  Jean Paul  Education: Highest Level of Education: Post-Graduate Degree  Siblings & Children Information:  Siblings: brothers: 1 older brother and sisters: 1 younger sister    Relationships / Social History:  Patient lives with: patient lives with partner, Luis and dog, Glenn and cat, Raúl  Number of people in the house:, including patient: 2  Relationship status: Relationship Status / Family Dynamic: In a Relationship: Yes  How Long?  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 N/A  Did the patient receive free medicine or co-pay assistance from a Patient Assistance Program? Yes, describe: vertex and creon    Disability  Are you on any form of disability? no    Adherence and Understanding of Health:  Knowledge of health:  good, Overall adherence:  good, Adherence with medications:  good, Managed by: patient, Understanding of medication:  good, and Adherence with appointments:    "fair    Cognition/Mood/Affect:  The patient was neatly groomed, appropriately dressed and adequately nourished.  What are the patient's psychosocial stressors:  \"nothing much\"    Mood:   How has your mood been lately?: good  How do you manage stress?: talk with partner, walk dog, prioritize sleep, good support, goes to gym  What are your goals for this year?: clean out and organize house  What about having CF do you find challenging? Used to be more challenging before Trikta, when used to have daily cough.     Thought Processes   Organized?  yes  Have you ever been hospitalized in a psychiatric hospital? no  Do you currently or have you ever seen a therapist/counselor/psychiatrist? no  Have you used medications for mental health issues, sleep and/or pain now or in the past?  no    Substance Use:  Alcohol - 1x a month    Advance Directives  Do you have an advance directive/living will?  no  has NO advanced directive - not interested in additional information but interested in completing at next visit as she would like to name her partner, Luis, as primary.     Impression:  patient open and agreeable to AA/MHS. Patient is a resident at . SW encouraged patient to focus on self-care in residency  SW participated in a CF clinic huddle during which time the patients care plan, treatment needs and issues were discussed. The information noted above has been shared with the CF team during CF clinic.     PHQ9: 0  GAD7: 1    Assessment/Plan   On-going psychosocial and emotional support, supportive counseling and referrals as indicated to maximize adjustment to living with cystic fibrosis.    Marti Andres LCSW  March 26, 2025  "

## 2025-03-26 NOTE — PROGRESS NOTES
"Reason for follow up:   Pt. Was having trouble finding time to eat during residency    Goals or plan from last visit:  Increase physical activity as able  Increase vegetable intake  Pair carbs with protein and fat  Complete liver US    Subjective update/information:  Pt. Is here alone today. She has been feeling good and did not have any nutritional concerns. She reports that she is putting in more of an effort to find time to eat. She denies having any GI concerns. She reports her blood sugars have been well-managed. She goes to the gym 5-6 days/week. She lives with her boyfriend and they take turns grocery shopping and cooking. Pt. Denies difficulty getting a variety of foods into her diet. Pt. Had no further concerns at this time. RD provided contact info and encouraged pt. To reach out as nutritional concerns arise. Pt. Was agreeable.      Objective updates:    Current weight/BMI:  BMI: 21.48 kg/m²  Height: 1.621 m (5' 3.82\")  as of 3/26/2025  Weight: 56.4 kg (124 lb 7.2 oz)  as of 3/26/2025     Weight/BMI trend:  -0.3 kg since 12/20/24    Food insecurity screen:  Negative    New labs/test results since last RD note:  None, labs drawn today    Admissions/calls since last visit:   None    Assessment and Plan:  At risk BMI  Pt. Is physically active  No GI concerns  BS well-managed  Pt. Eats well-balanced diet  Complete liver US  RD to check labs after resulted    Smart goals if goal has been set:  None set at this time    Follow-up:  RD will call pt. If needs recommendations for labs  RD will see pt. At next clinic visit for annual  Pt. Is encouraged to reach out to RD with nutritional concerns between clinic visits  "

## 2025-03-26 NOTE — PATIENT INSTRUCTIONS
"It was very nice to see you today. We can offer you in-person and \"virtual\" appointments with your cystic fibrosis provider.    If you need to cancel or schedule an appointment, please call the  at the [facility] at [phone] between the hours of 8 AM and 5 PM, Monday-Friday.    To reach the CF nurse, Dali, please call [phone]. Britt has a secure voice mail account if you want to leave a message.  Both Dali and NAM are available by Workboard as well!    If you need to speak with an adult cystic fibrosis provider between the hours of 5 PM and 8 AM (overnight) or anytime on Saturday or Sunday, please call [phone]. When you call this number, you will be connected to an  with the [facility] and [facility] answering service. You should tell the  that you're an adult with cystic fibrosis who needs to speak to the \"cystic fibrosis doctor on-call.\" The  will take your contact information. You should then expect a call back from the cystic fibrosis doctor on-call within a short period of time.      Information on COVID-19 Vaccination:  Vaccines.gov (https://www.vaccines.gov/)  [place] Department of Health (https://gettheshot.coronavirus.[place].gov/)    If you have COVID-19 infection and we decide to treat with anti-viral medications, what pharmacies have medications in-stock?  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   (goal for adult males with CF = 23.0 kg/m2, goal for adult females with CF = 22.0 kg/m2)      We discussed the following:  - We changed Dulera to PRN Symbicort   - Please obtain liver ultrasound  - You are due for your DEXA scan in 2026  - We will get blood work " today!    Sick plan (please call our office if you think you need to take antibiotics or be admitted)  Doxycycline 100 mg by mouth twice daily x 14 day  Plan for Bactrim if needing antibiotics during the sumemer

## 2025-03-26 NOTE — PROGRESS NOTES
Neftali Walsh M.D. 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 Her last hospitalization was in May of 2018 when she was treated with IV cefepime and tobramycin and PO Bactrim for MRSA and Stenotrophomonas coverage. She recently was treated with inhaled aztreonam and PO Levaquin for PSA irradication successfully.    HPI 9/25/2024:  No concerns today. 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 Using dulera just PRN when sick. Has heard about Alyfrek - is more comfortable with Trikafta. Sleeping well, no abdominal pain, enzyme use stable, no laxative use, no sinus issues. Recently had a URI with some chest congestion and now some mild post-nasal drip which she is not taking medications for and doesn't feel like she needs any.      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[facility] [employer]   Lives with her boyfriend of 8 years - no changes to living situation; thinking about having kids in the next few years  [employer] [facility] and medical school  From [facility], immediate family has since moved to [place]     Current Use of Health Management Strategies:    Respiratory Interventions  Albuterol: Other ; 1-2x/month; sometimes before a cold with relief  Pulmozyme: 1-2x/month when sick  Hypertonic saline: Does not use; prior to Trikafta used 7% bid   HFCWO (percussive vest): Hillrom Vest 1-2 x/month  Oscillatory PEP: Does not use  Exercise: No Regular Exercise  LTE antagonist: No  Azithromycin: not any more  Aztreonam lysine ([place]): Does not use  Tobramycin Does not use: Does not use  Colistin: Does not use  Meropenem (inhaled): Does not use  ICS: Does not use  ICS/LABA: Mometasone-formoterol (Dulera HFA)  200mg-5mg- uses when sick with colds but otherwise not regular use  LAMA: No  CFTR modulator: Elexacaftor-Tezacaftor-Ivacaftor  "(Trikafta) Full-dose (2 orange tablets in AM, 1 blue tablet in PM) - Started December 2020  Oxygen: Does not use    Digestive Health Interventions    Acid-suppressing medication?: Yes - AM patoprazole  Using CF vitamins?: Yes  Taking pancreatic enzymes?: Yes Creon 24 2 meals 1 snacks  Any diarrhea?: No  Any steatorrhea?: No  Any constipation?: No  Using laxatives?: No  Feeding tube?: No  Supplemental enteral nutrition?: No    Pulmonary Function Test Results  3/26/2025: FEV1/FVC: 65; FEV1=2.89L (95% pred)  9/12/24 FEV1/FVC  65; FEV1= 2.89 (95% predicted)  1/24/24: FEV1/FVC: 70; FEV1=2.97L (92% pred)  5/8/23: FEV1/FVC 70; FEV1=3.03L (94% pred)  4/12/21: FEV1/FVC 72: FEV1 3.16L (96% pred)        Current Medications     Current Outpatient Medications   Medication Instructions    albuterol (ProAir HFA) 90 mcg/actuation inhaler Inhale 2 puffs by mouth every 4 hours if needed for wheezing or shortness of breath (and before airway clearance).    BD Ultra-Fine Bea Pen Needle 32 gauge x 5/32\" needle Use as directed with insulin pens to administer insulin 4 times a day    blood sugar diagnostic strip Use as directed to check blood glucose 3 times daily    blood-glucose meter (Accu-Chek Guide Glucose Meter) misc Use as directed to check blood glucoses 3 times a day    blood-glucose sensor (Dexcom G7 Sensor) device Use as directed to continuously monitor glucose. Change every 10 days.    cetirizine (ZYRTEC) 10 mg, oral, Daily    Dulera 200-5 mcg/actuation inhaler Inhale 1 puff by mouth 2 times a day    elexacaftor-tezacaftor-ivacaft (Trikafta) 100-50-75 mg tablet TAKE TWO (2) ORANGE TABLETS BY MOUTH EVERY MORNING AND ONE (1) BLUE TABLET BY MOUTH EVERY EVENING ABOUT 12 HOURS APART. TAKE WITH FATTY FOODS    glucagon (Baqsimi) 3 mg/actuation spray,non-aerosol Instill 3 mg (1 spray) into a single nostril as needed for severe hypoglycemia    insulin glargine (Lantus) 100 unit/mL (3 mL) pen Inject 6 units daily as directed per " insulin instructions.    insulin lispro (HumaLOG Filiberto KwikPen U-100) 100 unit/mL pen Take as directed per insulin instructions. Start with 1 unit per 30 grams of carb. Maximum 20 units/day.    lancets (Accu-Chek Softclix Lancets) misc Use as directed to check blood glucose 3 times a day    lipase-protease-amylase (Creon) 24,000-76,000 -120,000 unit capsule Take 2 capsules by mouth with meals (3 meals/day) and 1 capsule by mouth with snacks (2 snacks/day)    Lorena 0.25-35 mg-mcg tablet 1 tablet, oral, Daily    multivitamin tablet 1 tablet, oral, Daily    nebulizers (Altera Nebulizer Handset) misc 1 each, miscellaneous, 3 times daily, Please dispense one Ana Maria Altera Handset for inhaled Cay[place].    pantoprazole (PROTONIX) 40 mg, oral, 2 times daily, Do not crush, chew, or split.       Physical Examination     Vitals:    03/26/25 0848   BP: 120/76   Pulse: 99   Resp: 18   Temp: 36.6 °C (97.9 °F)   SpO2: 98%     Gen: no acute distress, comfortable  HENT: MMM, TM's clear bilaterally, sclear clear, nasal mucosa without drainage  Lungs: CTAB without wheezing/rales/rhonchi  CV: RRR no m/r/g  Abdomen: +BS, no HSP, non-tender and non-distended  Ext: No LE Edema  Psychiatric: appropriate mood for situation     Metabolic Parameters     Sodium   Date/Time Value Ref Range Status   08/12/2024 08:04  136 - 145 mmol/L Final     Potassium   Date/Time Value Ref Range Status   08/12/2024 08:04 AM 4.4 3.5 - 5.3 mmol/L Final     Chloride   Date/Time Value Ref Range Status   08/12/2024 08:04  98 - 107 mmol/L Final     Bicarbonate   Date/Time Value Ref Range Status   08/12/2024 08:04 AM 24 21 - 32 mmol/L Final     Anion Gap   Date/Time Value Ref Range Status   08/12/2024 08:04 AM 13 10 - 20 mmol/L Final     Urea Nitrogen   Date/Time Value Ref Range Status   08/12/2024 08:04 AM 13 6 - 23 mg/dL Final     Creatinine   Date/Time Value Ref Range Status   08/12/2024 08:04 AM 0.79 0.50 - 1.05 mg/dL Final     GFR Female   Date/Time  Value Ref Range Status   05/08/2023 12:24 PM >90 >90 mL/min/1.73m2 Final     Comment:      CALCULATIONS OF ESTIMATED GFR ARE PERFORMED   USING THE 2021 CKD-EPI STUDY REFIT EQUATION   WITHOUT THE RACE VARIABLE FOR THE IDMS-TRACEABLE   CREATININE METHODS.    https://jasn.asnjournals.org/content/early/2021/09/22/ASN.4032819886       Glucose   Date/Time Value Ref Range Status   08/12/2024 08:04 AM 96 74 - 99 mg/dL Final     Calcium   Date/Time Value Ref Range Status   08/12/2024 08:04 AM 9.2 8.6 - 10.6 mg/dL Final       Hematologic Parameters     WBC   Date/Time Value Ref Range Status   08/12/2024 08:04 AM 8.6 4.4 - 11.3 x10*3/uL Final     Neutrophils Absolute   Date/Time Value Ref Range Status   08/12/2024 08:04 AM 3.91 1.20 - 7.70 x10*3/uL Final     Comment:     Percent differential counts (%) should be interpreted in the context of the absolute cell counts (cells/uL).     Neutrophils %   Date/Time Value Ref Range Status   08/12/2024 08:04 AM 45.6 40.0 - 80.0 % Final     Lymphocytes %   Date/Time Value Ref Range Status   08/12/2024 08:04 AM 39.3 13.0 - 44.0 % Final     Monocytes %   Date/Time Value Ref Range Status   08/12/2024 08:04 AM 6.2 2.0 - 10.0 % Final     Basophils %   Date/Time Value Ref Range Status   08/12/2024 08:04 AM 0.8 0.0 - 2.0 % Final     Eosinophils Absolute   Date/Time Value Ref Range Status   08/12/2024 08:04 AM 0.67 0.00 - 0.70 x10*3/uL Final     Eosinophils %   Date/Time Value Ref Range Status   08/12/2024 08:04 AM 7.8 0.0 - 6.0 % Final     Hemoglobin   Date/Time Value Ref Range Status   08/12/2024 08:04 AM 13.1 12.0 - 16.0 g/dL Final     Hematocrit   Date/Time Value Ref Range Status   08/12/2024 08:04 AM 40.1 36.0 - 46.0 % Final     RBC   Date/Time Value Ref Range Status   08/12/2024 08:04 AM 4.22 4.00 - 5.20 x10*6/uL Final     MCV   Date/Time Value Ref Range Status   08/12/2024 08:04 AM 95 80 - 100 fL Final     MCHC   Date/Time Value Ref Range Status   08/12/2024 08:04 AM 32.7 32.0 - 36.0 g/dL  Final     Platelets   Date/Time Value Ref Range Status   08/12/2024 08:04  150 - 450 x10*3/uL Final       Fat-Soluble Vitamin Levels     Vitamin D, 25-Hydroxy, Total   Date/Time Value Ref Range Status   01/24/2024 12:38 PM 48 30 - 100 ng/mL Final     Vitamin A (Retinol)   Date/Time Value Ref Range Status   01/24/2024 12:38 PM 0.91 0.30 - 1.20 mg/L Final     Vitamin A, Interpretation   Date/Time Value Ref Range Status   01/24/2024 12:38 PM Normal  Final     Comment:       This test was developed and its performance characteristics   determined by [facility]. It has not been cleared or   approved by the US Food and Drug Administration. This test was   performed in a CLIA certified laboratory and is intended for   clinical purposes.  Performed By: [facility]  [address] [place] [address]  : Trevor Gilliam MD, PhD  CLIA Number: [id number]     Vitamin E (Alpha-Tocopherol)   Date/Time Value Ref Range Status   01/24/2024 12:38 PM 12.1 5.5 - 18.0 mg/L Final     Comment:       This test was developed and its performance characteristics   determined by [facility]. It has not been cleared or   approved by the US Food and Drug Administration. This test was   performed in a CLIA certified laboratory and is intended for   clinical purposes.     Vitamin E (Gamma-Tocopherol)   Date/Time Value Ref Range Status   01/24/2024 12:38 PM 0.2 0.0 - 6.0 mg/L Final     Comment:     Performed By: [facility]  [address] [place], [place] [id number]  : Trevor Gilliam MD, PhD  CLIA Number: [id number]       LFT and Associated Parameters     AST   Date/Time Value Ref Range Status   08/12/2024 08:04 AM 10 9 - 39 U/L Final     ALT   Date/Time Value Ref Range Status   08/12/2024 08:04 AM 9 7 - 45 U/L Final     Comment:     Patients treated with Sulfasalazine may generate falsely decreased results for ALT.     Alkaline Phosphatase   Date/Time Value Ref  Range Status   08/12/2024 08:04 AM 78 33 - 110 U/L Final     Bilirubin, Total   Date/Time Value Ref Range Status   08/12/2024 08:04 AM 0.4 0.0 - 1.2 mg/dL Final     GGT   Date/Time Value Ref Range Status   01/24/2024 12:38 PM 14 5 - 55 U/L Final     Albumin   Date/Time Value Ref Range Status   08/12/2024 08:04 AM 3.8 3.4 - 5.0 g/dL Final     Total Protein   Date/Time Value Ref Range Status   08/12/2024 08:04 AM 7.1 6.4 - 8.2 g/dL Final       Coagulation Parameters     Protime   Date/Time Value Ref Range Status   05/29/2019 02:05 PM 11.4 9.7 - 12.7 sec Final     INR   Date/Time Value Ref Range Status   05/29/2019 02:05 PM 1.0 0.9 - 1.1 Final       Diabetes Laboratory Tests     POC HEMOGLOBIN A1c   Date/Time Value Ref Range Status   03/12/2025 02:40 PM 5.0 4.2 - 6.5 % Final     Albumin/Creatine Ratio   Date/Time Value Ref Range Status   05/08/2023 12:40 PM SEE COMMENT 0.0 - 30.0 ug/mg crt Final     Comment:     One or more analytes used in this calculation   is outside of the analytical measurement range.  Calculation cannot be performed.          Immunology Laboratory Tests     IgE   Date/Time Value Ref Range Status   01/24/2024 12:38 PM 19 0 - 214 IU/mL Final       DEXA Scan     XR DEXA bone density 05/08/2023    Narrative  Interpreted By:  RAMILA MORGAN MD  Name: ADRIÁN VALENZUELA  Patient ID: [id number] YOB: 1995 Height: 64.0 in.  Gender:     Female   Exam Date:  05/08/2023 Weight: 135.0 lbs.  Indications: Cystis Fibrosis, Family Histoy Osteoporosis, Post  Menopausal, Screening  Fractures:    Treatments:  Multivitamin        LEFT FEMUR - TOTAL  The bone mineral density : 0.827 g/cm2  T-score : -1.4    % of young normal mean :82 %  Z-score : -1.4    % of age matched mean : 82 %  % change vs. Previous : -     % change vs. Baseline : baseline  *Indicates significant change based on 95% confidence interval.    LEFT FEMUR - NECK  The bone mineral density : 0.866 g/cm2  T-score : -1.2    % of young normal  mean: 83 %  Z-score : -1.2    % of age matched mean : 84 %  % change vs. Previous : -     % change vs. Baseline : baseline  *Indicates significant change based on 95% confidence interval.    SPINE L1-L4  The bone mineral density is 0.897 g/cm2  T-score : -2.4   % of young normal mean is 76 %  Z-score : -2.4    % of age matched mean is 76 %  % change vs. Previous : -     % change vs. Baseline : baseline  *Indicates significant change based on 95% confidence interval.    Interpretation:  The lowest Z-score measured at AP Spine L1-L4 with a Z-score of -2.4  is below normal limits for their age and sex.    Follow-up DEXA and treatment is recommended as clinically indicated.    All images and detailed analysis are available on the  Radiology  PACS.       Chest Radiograph     XR chest 2 view 05/08/2023    Narrative  Interpreted By:  TISHA RODRIGUEZ MD and MANUEL HERNANDEZ MD  MRN: [id number]  Patient Name: ADRIÁN VALENZUELA    STUDY:   CHEST 2 VIEW PA AND LAT;  5/8/2023 2:32 pm    INDICATION:  baseline for CF, malabsorption  Z00.00: Health Maintenance E84.9:  Cystic fibrosis.    COMPARISON:  Chest radiograph, 05/25/2018    ACCESSION NUMBER(S):  [id number]    ORDERING CLINICIAN:  CRISTINA VELASQUEZ    FINDINGS:  PA and lateral radiographs of the chest were provided.  Additional PA  dual energy images were also provided.    CARDIOMEDIASTINAL SILHOUETTE:  Cardiomediastinal silhouette is normal in size and configuration.    LUNGS:  Background changes of cystic fibrosis with cystic changes and mild  peribronchial thickening, not significantly changed compared to prior  study on 05/25/2018. No consolidation, pleural effusion, or  pneumothorax.    ABDOMEN:  No remarkable upper abdominal findings.    BONES:  No acute osseous changes.    Impression  1.  Background changes of cystic fibrosis, similar to prior study on  05/25/2018.  2. No acute cardiopulmonary process.    I personally reviewed the images/study and I agree with the  findings  as stated by resident physician Dr. Dee Dee Freire.       CF Sputum Culture     AFB Culture   Date Value Ref Range Status   09/25/2024 No Mycobacteria isolated.  Final   03/25/2024 No Mycobacteria isolated.  Final   01/24/2024 No Mycobacteria isolated.  Final      Respiratory Culture, Cystic Fibrosis   Date/Time Value Ref Range Status   09/25/2024 10:56 AM (2+) Few Normal throat ganesh  Final   09/25/2024 10:56 AM (A)  Final    (1+) Rare Methicillin Susceptible Staphylococcus aureus (MSSA)       No results found for the last 90 days.        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 fibrosis (Multi)    Overview     Diagnosis Date: May 19, 2009  DKA at Diagnosis: No--dx on OGTT  Antibody Positivity: no  Started levemir June 2009           Relevant Orders    Hemoglobin A1C    Albumin-Creatinine Ratio, Urine Random    Vitamin D deficiency    Relevant Orders    Vitamin D 25-Hydroxy,Total (for eval of Vitamin D levels)    Cystic fibrosis - Primary    Relevant Medications    budesonide-formoterol (Symbicort) 160-4.5 mcg/actuation inhaler    Other Relevant Orders    US abdomen limited liver    Respiratory Culture, Cystic Fibrosis    AFB Culture/Smear    Fungal Culture/Smear    CBC and Auto Differential    Gamma-Glutamyl Transferase    Hemoglobin A1C    Hepatic Function Panel    Immunoglobulin IgE    Lipid Panel Non-Fasting    Magnesium    Renal Function Panel    Vitamin A    Vitamin D 25-Hydroxy,Total (for eval of Vitamin D levels)    Vitamin E    Albumin-Creatinine Ratio, Urine Random    Urinalysis with Reflex Microscopic    Protime-INR    Follow Up In Pediatric Pulmonology          Jadiel Reyes is a 29 year old F with hx of CF on Trikafta with last exacerbation in 2018 without need for OP antibiotics since that last admission. She has no somatic concerns today.  We discussed Alyftrek and she is not interested at this time. We are switching to Symbicort PRN from Dulera  due to lack of use of Dulera.    > Cystic Fibrosis  (dF508/dF508; FEV1=92%) with chronic MRSA, PSA  - Modulator: Trikafta (Start December 2020)  - Bronchodilators: Symbicort PRN  - HTS: does not use  - Dornase: occasional use  - Inhaled ABX: none  - Airway Clearance: none  - Sick plan: Doxycycline, will do Bactrim if during the summer x 14 days    Health Care Maintenance:  - Spirometry completed: today  - Annual lab:s Obtained today  - CF Cultures/Swab today -> Sputum sample provided today  - AFB today  - Last CXR - 5/8/23  - DEXA Scan - 5/8/23 - below limits of normal for age; repeat 2026  - Transplant - discussed indications for lung transplant in female patients with CF at last visit (9/2024)  - Colon Ca Screening - not yet due  - Liver ultrasound - re-ordered and re-emphysized today    > CFLD  - Follows with Dr. Matthews, Has CGM, recent meal-insulin adjsutment    > Exocrine Pancreatic Insufficiency  - 24Creon 2 capsules with meals and 1 capsule with snacks  - Continue DEKAs supplementation    > Weight Decrease in past - now stable  - continue current diet    > [employer] [facility]  - does have gyn      Tami Brown MD   03/26/2025

## 2025-03-26 NOTE — PROGRESS NOTES
Respiratory Note:    Patient's Airway Clearance: Aurelio Gannon - reviewed [employer]  Frequency: PRN  Exercise: yes gym most mornings - [employer] / walks her dog - anand retriever   Oscillating Device: yes aerobika PRN  Luo Cough: no  Primary: exercise  Secondary: vest    Aerosols: Name of medication, frequency, type of nebulizer used, Mask or Mouthpiece?  Bronchodilators: yes albuterol inhaler PRN  Pulmozyme: yes PRN has a supply on hand  Hypertonic Saline:  no  Antibiotics:  no (cayston in the past) confirmed she does have her cFares machine  ICS/Combinations: yes Dulera - switching to symbicort today per Dr. Brown to use as needed    Spacer: yes    Home PFT Device: yes - discussed an option to help her get her device set up for use for either routine home pfts or for virtual visits    Oxygen: no    CPAP, BIPAP, Assisted Breathing (use at home or in-patient): no    Have you smoked cigarettes in the last year?: no     Are you exposed to second hand smoke: no    Does anyone in your household smoke cigarettes?: no    Did you use electronic cigarettes (vape) this year?: no    During the reporting year, how often was this patient vaping?: not at all

## 2025-03-27 LAB
ACID FAST STN SPEC: NORMAL
MYCOBACTERIUM SPEC CULT: NORMAL

## 2025-03-28 LAB
FUNGUS SPEC CULT: NORMAL
FUNGUS SPEC FUNGUS STN: NORMAL

## 2025-03-29 LAB
25(OH)D3+25(OH)D2 SERPL-MCNC: 37 NG/ML (ref 30–100)
A-TOCOPHEROL VIT E SERPL-MCNC: 9.7 MG/L (ref 5.7–19.9)
ALBUMIN SERPL-MCNC: 4.6 G/DL (ref 3.6–5.1)
ALBUMIN SERPL-MCNC: 4.6 G/DL (ref 3.6–5.1)
ALBUMIN/CREAT UR: NORMAL MG/G CREAT
ALBUMIN/GLOB SERPL: 1.8 (CALC) (ref 1–2.5)
ALP SERPL-CCNC: 66 U/L (ref 31–125)
ALT SERPL-CCNC: 21 U/L (ref 6–29)
APPEARANCE UR: CLEAR
AST SERPL-CCNC: 19 U/L (ref 10–30)
BACTERIA SPT CF RESP CULT: ABNORMAL
BACTERIA SPT CF RESP CULT: ABNORMAL
BASOPHILS # BLD AUTO: 48 CELLS/UL (ref 0–200)
BASOPHILS NFR BLD AUTO: 0.7 %
BETA+GAMMA TOCOPHEROL SERPL-MCNC: <1 MG/L
BILIRUB DIRECT SERPL-MCNC: 0.1 MG/DL
BILIRUB INDIRECT SERPL-MCNC: 0.4 MG/DL (CALC) (ref 0.2–1.2)
BILIRUB SERPL-MCNC: 0.5 MG/DL (ref 0.2–1.2)
BILIRUB UR QL STRIP: NEGATIVE
BUN SERPL-MCNC: 19 MG/DL (ref 7–25)
BUN/CREAT SERPL: NORMAL (CALC) (ref 6–22)
CALCIUM SERPL-MCNC: 9.3 MG/DL (ref 8.6–10.2)
CHLORIDE SERPL-SCNC: 105 MMOL/L (ref 98–110)
CHOLEST SERPL-MCNC: 139 MG/DL
CHOLEST/HDLC SERPL: 2.2 (CALC)
CO2 SERPL-SCNC: 24 MMOL/L (ref 20–32)
COLOR UR: YELLOW
CREAT SERPL-MCNC: 0.65 MG/DL (ref 0.5–0.96)
CREAT UR-MCNC: 26 MG/DL (ref 20–275)
EGFRCR SERPLBLD CKD-EPI 2021: 123 ML/MIN/1.73M2
EOSINOPHIL # BLD AUTO: 269 CELLS/UL (ref 15–500)
EOSINOPHIL NFR BLD AUTO: 3.9 %
ERYTHROCYTE [DISTWIDTH] IN BLOOD BY AUTOMATED COUNT: 12 % (ref 11–15)
EST. AVERAGE GLUCOSE BLD GHB EST-MCNC: 97 MG/DL
EST. AVERAGE GLUCOSE BLD GHB EST-SCNC: 5.4 MMOL/L
GGT SERPL-CCNC: 14 U/L (ref 3–40)
GLOBULIN SER CALC-MCNC: 2.5 G/DL (CALC) (ref 1.9–3.7)
GLUCOSE SERPL-MCNC: 82 MG/DL (ref 65–99)
GLUCOSE UR QL STRIP: NEGATIVE
HBA1C MFR BLD: 5 % OF TOTAL HGB
HCT VFR BLD AUTO: 41.6 % (ref 35–45)
HDLC SERPL-MCNC: 63 MG/DL
HGB BLD-MCNC: 13.7 G/DL (ref 11.7–15.5)
HGB UR QL STRIP: NEGATIVE
IGE SERPL-ACNC: 34 KU/L
INR PPP: 1.1
KETONES UR QL STRIP: NEGATIVE
LDLC SERPL CALC-MCNC: 61 MG/DL (CALC)
LEUKOCYTE ESTERASE UR QL STRIP: NEGATIVE
LYMPHOCYTES # BLD AUTO: 1670 CELLS/UL (ref 850–3900)
LYMPHOCYTES NFR BLD AUTO: 24.2 %
MAGNESIUM SERPL-MCNC: 2.1 MG/DL (ref 1.5–2.5)
MCH RBC QN AUTO: 33.3 PG (ref 27–33)
MCHC RBC AUTO-ENTMCNC: 32.9 G/DL (ref 32–36)
MCV RBC AUTO: 101 FL (ref 80–100)
MICROALBUMIN UR-MCNC: <0.2 MG/DL
MONOCYTES # BLD AUTO: 359 CELLS/UL (ref 200–950)
MONOCYTES NFR BLD AUTO: 5.2 %
NEUTROPHILS # BLD AUTO: 4554 CELLS/UL (ref 1500–7800)
NEUTROPHILS NFR BLD AUTO: 66 %
NITRITE UR QL STRIP: NEGATIVE
NONHDLC SERPL-MCNC: 76 MG/DL (CALC)
PH UR STRIP: 6 [PH] (ref 5–8)
PHOSPHATE SERPL-MCNC: 4.3 MG/DL (ref 2.5–4.5)
PLATELET # BLD AUTO: 263 THOUSAND/UL (ref 140–400)
PMV BLD REES-ECKER: 10.5 FL (ref 7.5–12.5)
POTASSIUM SERPL-SCNC: 4.4 MMOL/L (ref 3.5–5.3)
PROT SERPL-MCNC: 7.1 G/DL (ref 6.1–8.1)
PROT UR QL STRIP: NEGATIVE
PROTHROMBIN TIME: 11.9 SEC (ref 9–11.5)
RBC # BLD AUTO: 4.12 MILLION/UL (ref 3.8–5.1)
SODIUM SERPL-SCNC: 139 MMOL/L (ref 135–146)
SP GR UR STRIP: 1.01 (ref 1–1.03)
TRIGL SERPL-MCNC: 72 MG/DL
VIT A SERPL-MCNC: 44 MCG/DL (ref 38–98)
WBC # BLD AUTO: 6.9 THOUSAND/UL (ref 3.8–10.8)

## 2025-03-31 LAB
FUNGUS SPEC CULT: NORMAL
FUNGUS SPEC FUNGUS STN: NORMAL

## 2025-04-01 PROCEDURE — RXMED WILLOW AMBULATORY MEDICATION CHARGE

## 2025-04-02 ENCOUNTER — SPECIALTY PHARMACY (OUTPATIENT)
Dept: PHARMACY | Facility: CLINIC | Age: 30
End: 2025-04-02

## 2025-04-02 LAB
ACID FAST STN SPEC: NORMAL
MYCOBACTERIUM SPEC CULT: NORMAL

## 2025-04-07 ENCOUNTER — PHARMACY VISIT (OUTPATIENT)
Dept: PHARMACY | Facility: CLINIC | Age: 30
End: 2025-04-07
Payer: COMMERCIAL

## 2025-04-07 LAB
FUNGUS SPEC CULT: NORMAL
FUNGUS SPEC FUNGUS STN: NORMAL

## 2025-04-09 LAB
ACID FAST STN SPEC: NORMAL
MYCOBACTERIUM SPEC CULT: NORMAL

## 2025-04-14 DIAGNOSIS — K21.9 GASTRIC REFLUX: ICD-10-CM

## 2025-04-14 LAB
FUNGUS SPEC CULT: NORMAL
FUNGUS SPEC FUNGUS STN: NORMAL

## 2025-04-14 PROCEDURE — RXMED WILLOW AMBULATORY MEDICATION CHARGE

## 2025-04-14 RX ORDER — PANTOPRAZOLE SODIUM 40 MG/1
40 TABLET, DELAYED RELEASE ORAL 2 TIMES DAILY
Qty: 60 TABLET | Refills: 11 | Status: SHIPPED | OUTPATIENT
Start: 2025-04-14 | End: 2026-04-14

## 2025-04-15 DIAGNOSIS — E84.9 CYSTIC FIBROSIS: ICD-10-CM

## 2025-04-15 RX ORDER — ELEXACAFTOR, TEZACAFTOR, AND IVACAFTOR 100-50-75
KIT ORAL
Qty: 84 EACH | Refills: 5 | Status: SHIPPED | OUTPATIENT
Start: 2025-04-15 | End: 2026-04-15

## 2025-04-16 LAB
ACID FAST STN SPEC: NORMAL
MYCOBACTERIUM SPEC CULT: NORMAL

## 2025-04-18 ENCOUNTER — PHARMACY VISIT (OUTPATIENT)
Dept: PHARMACY | Facility: CLINIC | Age: 30
End: 2025-04-18
Payer: COMMERCIAL

## 2025-04-18 PROCEDURE — RXMED WILLOW AMBULATORY MEDICATION CHARGE

## 2025-04-21 PROCEDURE — RXMED WILLOW AMBULATORY MEDICATION CHARGE

## 2025-04-23 ENCOUNTER — PHARMACY VISIT (OUTPATIENT)
Dept: PHARMACY | Facility: CLINIC | Age: 30
End: 2025-04-23
Payer: COMMERCIAL

## 2025-04-23 LAB
ACID FAST STN SPEC: NORMAL
MYCOBACTERIUM SPEC CULT: NORMAL

## 2025-04-30 ENCOUNTER — SPECIALTY PHARMACY (OUTPATIENT)
Dept: PHARMACY | Facility: CLINIC | Age: 30
End: 2025-04-30

## 2025-04-30 DIAGNOSIS — E84.8 DIABETES MELLITUS RELATED TO CYSTIC FIBROSIS (MULTI): ICD-10-CM

## 2025-04-30 DIAGNOSIS — E08.9 DIABETES MELLITUS RELATED TO CYSTIC FIBROSIS (MULTI): ICD-10-CM

## 2025-04-30 DIAGNOSIS — E84.8 EXOCRINE PANCREATIC MANIFESTATION OF CYSTIC FIBROSIS (MULTI): ICD-10-CM

## 2025-04-30 LAB
ACID FAST STN SPEC: NORMAL
MYCOBACTERIUM SPEC CULT: NORMAL

## 2025-04-30 RX ORDER — BLOOD-GLUCOSE SENSOR
EACH MISCELLANEOUS
Qty: 3 EACH | Refills: 11 | Status: SHIPPED | OUTPATIENT
Start: 2025-04-30

## 2025-05-01 ENCOUNTER — PHARMACY VISIT (OUTPATIENT)
Dept: PHARMACY | Facility: CLINIC | Age: 30
End: 2025-05-01
Payer: COMMERCIAL

## 2025-05-07 LAB
ACID FAST STN SPEC: NORMAL
MYCOBACTERIUM SPEC CULT: NORMAL

## 2025-05-12 PROCEDURE — RXMED WILLOW AMBULATORY MEDICATION CHARGE

## 2025-05-13 DIAGNOSIS — E08.9 DIABETES MELLITUS RELATED TO CYSTIC FIBROSIS (MULTI): ICD-10-CM

## 2025-05-13 DIAGNOSIS — E84.8 DIABETES MELLITUS RELATED TO CYSTIC FIBROSIS (MULTI): ICD-10-CM

## 2025-05-13 DIAGNOSIS — E84.8 EXOCRINE PANCREATIC MANIFESTATION OF CYSTIC FIBROSIS (MULTI): ICD-10-CM

## 2025-05-13 PROCEDURE — RXMED WILLOW AMBULATORY MEDICATION CHARGE

## 2025-05-14 ENCOUNTER — PHARMACY VISIT (OUTPATIENT)
Dept: PHARMACY | Facility: CLINIC | Age: 30
End: 2025-05-14
Payer: COMMERCIAL

## 2025-05-14 LAB
ACID FAST STN SPEC: NORMAL
MYCOBACTERIUM SPEC CULT: NORMAL

## 2025-05-14 RX ORDER — INSULIN GLARGINE 100 [IU]/ML
INJECTION, SOLUTION SUBCUTANEOUS
Qty: 15 ML | Refills: 12 | Status: SHIPPED | OUTPATIENT
Start: 2025-05-14

## 2025-05-21 ENCOUNTER — SPECIALTY PHARMACY (OUTPATIENT)
Dept: PHARMACY | Facility: CLINIC | Age: 30
End: 2025-05-21

## 2025-05-27 PROCEDURE — RXMED WILLOW AMBULATORY MEDICATION CHARGE

## 2025-05-29 DIAGNOSIS — E08.9 DIABETES MELLITUS RELATED TO CYSTIC FIBROSIS (MULTI): ICD-10-CM

## 2025-05-29 DIAGNOSIS — E84.8 DIABETES MELLITUS RELATED TO CYSTIC FIBROSIS (MULTI): ICD-10-CM

## 2025-05-29 RX ORDER — INSULIN GLARGINE-YFGN 100 [IU]/ML
INJECTION, SOLUTION SUBCUTANEOUS
Qty: 15 ML | Refills: 11 | Status: SHIPPED | OUTPATIENT
Start: 2025-05-29 | End: 2025-05-30 | Stop reason: SDUPTHER

## 2025-05-30 ENCOUNTER — PHARMACY VISIT (OUTPATIENT)
Dept: PHARMACY | Facility: CLINIC | Age: 30
End: 2025-05-30
Payer: COMMERCIAL

## 2025-05-30 DIAGNOSIS — E08.9 DIABETES MELLITUS RELATED TO CYSTIC FIBROSIS (MULTI): ICD-10-CM

## 2025-05-30 DIAGNOSIS — E84.8 DIABETES MELLITUS RELATED TO CYSTIC FIBROSIS (MULTI): ICD-10-CM

## 2025-05-30 PROCEDURE — RXMED WILLOW AMBULATORY MEDICATION CHARGE

## 2025-05-30 RX ORDER — INSULIN GLARGINE-YFGN 100 [IU]/ML
INJECTION, SOLUTION SUBCUTANEOUS
Qty: 10 ML | Refills: 11 | Status: SHIPPED | OUTPATIENT
Start: 2025-05-30

## 2025-06-02 ENCOUNTER — SPECIALTY PHARMACY (OUTPATIENT)
Dept: PHARMACY | Facility: CLINIC | Age: 30
End: 2025-06-02

## 2025-06-02 PROCEDURE — RXMED WILLOW AMBULATORY MEDICATION CHARGE

## 2025-06-03 ENCOUNTER — PHARMACY VISIT (OUTPATIENT)
Dept: PHARMACY | Facility: CLINIC | Age: 30
End: 2025-06-03
Payer: COMMERCIAL

## 2025-06-10 PROCEDURE — RXMED WILLOW AMBULATORY MEDICATION CHARGE

## 2025-06-11 ENCOUNTER — PHARMACY VISIT (OUTPATIENT)
Dept: PHARMACY | Facility: CLINIC | Age: 30
End: 2025-06-11
Payer: COMMERCIAL

## 2025-06-23 PROCEDURE — RXMED WILLOW AMBULATORY MEDICATION CHARGE

## 2025-06-24 ENCOUNTER — PHARMACY VISIT (OUTPATIENT)
Dept: PHARMACY | Facility: CLINIC | Age: 30
End: 2025-06-24
Payer: COMMERCIAL

## 2025-06-24 ENCOUNTER — SPECIALTY PHARMACY (OUTPATIENT)
Dept: PHARMACY | Facility: CLINIC | Age: 30
End: 2025-06-24

## 2025-06-24 PROCEDURE — RXMED WILLOW AMBULATORY MEDICATION CHARGE

## 2025-06-26 ENCOUNTER — PHARMACY VISIT (OUTPATIENT)
Dept: PHARMACY | Facility: CLINIC | Age: 30
End: 2025-06-26
Payer: COMMERCIAL

## 2025-07-01 NOTE — PROGRESS NOTES
Nurse Assessment:     SOB: denied   2.    O2:  n/a  3.    Mediport:   no  4.    Flu vaccine: 9/2023  5.    COVID  Booster:  not yet   6.    Pt discussion/chief complaint/agenda:  Health is good, no concerns. Pt needs annual labs and culture today. Sputum obtained.      25.1

## 2025-07-10 PROCEDURE — RXMED WILLOW AMBULATORY MEDICATION CHARGE

## 2025-07-14 ENCOUNTER — PHARMACY VISIT (OUTPATIENT)
Dept: PHARMACY | Facility: CLINIC | Age: 30
End: 2025-07-14
Payer: COMMERCIAL

## 2025-07-17 PROCEDURE — RXMED WILLOW AMBULATORY MEDICATION CHARGE

## 2025-07-18 ENCOUNTER — PHARMACY VISIT (OUTPATIENT)
Dept: PHARMACY | Facility: CLINIC | Age: 30
End: 2025-07-18
Payer: COMMERCIAL

## 2025-07-21 PROCEDURE — RXMED WILLOW AMBULATORY MEDICATION CHARGE

## 2025-07-22 ENCOUNTER — PHARMACY VISIT (OUTPATIENT)
Dept: PHARMACY | Facility: CLINIC | Age: 30
End: 2025-07-22
Payer: COMMERCIAL

## 2025-07-23 ENCOUNTER — APPOINTMENT (OUTPATIENT)
Dept: RADIOLOGY | Facility: HOSPITAL | Age: 30
End: 2025-07-23
Payer: COMMERCIAL

## 2025-07-25 ENCOUNTER — APPOINTMENT (OUTPATIENT)
Dept: RADIOLOGY | Facility: HOSPITAL | Age: 30
End: 2025-07-25
Payer: COMMERCIAL

## 2025-08-04 ENCOUNTER — APPOINTMENT (OUTPATIENT)
Dept: RADIOLOGY | Facility: HOSPITAL | Age: 30
End: 2025-08-04
Payer: COMMERCIAL

## 2025-08-06 PROCEDURE — RXMED WILLOW AMBULATORY MEDICATION CHARGE

## 2025-08-07 ENCOUNTER — PHARMACY VISIT (OUTPATIENT)
Dept: PHARMACY | Facility: CLINIC | Age: 30
End: 2025-08-07
Payer: COMMERCIAL

## 2025-08-11 PROCEDURE — RXMED WILLOW AMBULATORY MEDICATION CHARGE

## 2025-08-13 ENCOUNTER — APPOINTMENT (OUTPATIENT)
Dept: OBSTETRICS AND GYNECOLOGY | Facility: HOSPITAL | Age: 30
End: 2025-08-13
Payer: COMMERCIAL

## 2025-08-14 ENCOUNTER — PHARMACY VISIT (OUTPATIENT)
Dept: PHARMACY | Facility: CLINIC | Age: 30
End: 2025-08-14
Payer: COMMERCIAL

## 2025-08-15 PROCEDURE — RXMED WILLOW AMBULATORY MEDICATION CHARGE

## 2025-08-18 ENCOUNTER — PHARMACY VISIT (OUTPATIENT)
Dept: PHARMACY | Facility: CLINIC | Age: 30
End: 2025-08-18
Payer: COMMERCIAL

## 2025-09-02 ENCOUNTER — APPOINTMENT (OUTPATIENT)
Dept: OBSTETRICS AND GYNECOLOGY | Facility: HOSPITAL | Age: 30
End: 2025-09-02
Payer: COMMERCIAL

## 2025-09-03 ENCOUNTER — SPECIALTY PHARMACY (OUTPATIENT)
Dept: PHARMACY | Facility: CLINIC | Age: 30
End: 2025-09-03